# Patient Record
Sex: FEMALE | Race: WHITE | NOT HISPANIC OR LATINO | Employment: OTHER | URBAN - METROPOLITAN AREA
[De-identification: names, ages, dates, MRNs, and addresses within clinical notes are randomized per-mention and may not be internally consistent; named-entity substitution may affect disease eponyms.]

---

## 2023-04-20 ENCOUNTER — OFFICE VISIT (OUTPATIENT)
Dept: FAMILY MEDICINE | Facility: CLINIC | Age: 78
End: 2023-04-20
Payer: MEDICARE

## 2023-04-20 VITALS
HEIGHT: 64 IN | DIASTOLIC BLOOD PRESSURE: 72 MMHG | OXYGEN SATURATION: 97 % | HEART RATE: 83 BPM | BODY MASS INDEX: 35.68 KG/M2 | RESPIRATION RATE: 18 BRPM | TEMPERATURE: 97 F | WEIGHT: 209 LBS | SYSTOLIC BLOOD PRESSURE: 148 MMHG

## 2023-04-20 DIAGNOSIS — Z13.820 OSTEOPOROSIS SCREENING: ICD-10-CM

## 2023-04-20 DIAGNOSIS — Z79.4 TYPE 2 DIABETES MELLITUS WITHOUT COMPLICATION, WITH LONG-TERM CURRENT USE OF INSULIN: Primary | Chronic | ICD-10-CM

## 2023-04-20 DIAGNOSIS — E11.9 TYPE 2 DIABETES MELLITUS WITHOUT COMPLICATION, WITH LONG-TERM CURRENT USE OF INSULIN: Primary | Chronic | ICD-10-CM

## 2023-04-20 DIAGNOSIS — E66.01 CLASS 2 SEVERE OBESITY DUE TO EXCESS CALORIES WITH SERIOUS COMORBIDITY AND BODY MASS INDEX (BMI) OF 35.0 TO 35.9 IN ADULT: Chronic | ICD-10-CM

## 2023-04-20 DIAGNOSIS — E78.2 MIXED HYPERLIPIDEMIA: Chronic | ICD-10-CM

## 2023-04-20 DIAGNOSIS — Z11.59 ENCOUNTER FOR SCREENING FOR OTHER VIRAL DISEASES: ICD-10-CM

## 2023-04-20 DIAGNOSIS — I25.10 CORONARY ARTERY DISEASE INVOLVING NATIVE HEART WITHOUT ANGINA PECTORIS, UNSPECIFIED VESSEL OR LESION TYPE: Chronic | ICD-10-CM

## 2023-04-20 DIAGNOSIS — I35.9 AORTIC VALVULAR DISEASE: Chronic | ICD-10-CM

## 2023-04-20 DIAGNOSIS — I10 ESSENTIAL HYPERTENSION: Chronic | ICD-10-CM

## 2023-04-20 DIAGNOSIS — M81.0 SENILE OSTEOPOROSIS: ICD-10-CM

## 2023-04-20 PROBLEM — E66.812 CLASS 2 SEVERE OBESITY DUE TO EXCESS CALORIES WITH SERIOUS COMORBIDITY AND BODY MASS INDEX (BMI) OF 35.0 TO 35.9 IN ADULT: Status: ACTIVE | Noted: 2023-04-20

## 2023-04-20 PROCEDURE — 99204 OFFICE O/P NEW MOD 45 MIN: CPT | Mod: S$GLB,,, | Performed by: NURSE PRACTITIONER

## 2023-04-20 PROCEDURE — 99204 PR OFFICE/OUTPT VISIT, NEW, LEVL IV, 45-59 MIN: ICD-10-PCS | Mod: S$GLB,,, | Performed by: NURSE PRACTITIONER

## 2023-04-20 RX ORDER — INSULIN GLARGINE 100 [IU]/ML
36 INJECTION, SOLUTION SUBCUTANEOUS NIGHTLY
COMMUNITY

## 2023-04-20 RX ORDER — SIMVASTATIN 40 MG/1
40 TABLET, FILM COATED ORAL NIGHTLY
COMMUNITY

## 2023-04-20 RX ORDER — GLIMEPIRIDE 4 MG/1
4 TABLET ORAL 2 TIMES DAILY
COMMUNITY
End: 2023-07-17 | Stop reason: SDUPTHER

## 2023-04-20 RX ORDER — LISINOPRIL 40 MG/1
40 TABLET ORAL DAILY
COMMUNITY

## 2023-04-20 RX ORDER — TORSEMIDE 10 MG/1
20 TABLET ORAL DAILY
Qty: 90 TABLET | Refills: 3 | Status: SHIPPED | OUTPATIENT
Start: 2023-04-20

## 2023-04-20 RX ORDER — CLINDAMYCIN HYDROCHLORIDE 150 MG/1
150 CAPSULE ORAL
COMMUNITY

## 2023-04-20 RX ORDER — LANOLIN ALCOHOL/MO/W.PET/CERES
400 CREAM (GRAM) TOPICAL 2 TIMES DAILY
COMMUNITY
End: 2023-07-17 | Stop reason: SDUPTHER

## 2023-04-20 RX ORDER — MECLIZINE HYDROCHLORIDE 25 MG/1
25 TABLET ORAL 2 TIMES DAILY PRN
COMMUNITY

## 2023-04-20 RX ORDER — METFORMIN HYDROCHLORIDE 1000 MG/1
1000 TABLET ORAL 2 TIMES DAILY WITH MEALS
COMMUNITY
End: 2023-07-17 | Stop reason: SDUPTHER

## 2023-04-20 RX ORDER — DULAGLUTIDE 1.5 MG/.5ML
1.5 INJECTION, SOLUTION SUBCUTANEOUS
COMMUNITY
End: 2023-10-19 | Stop reason: SDUPTHER

## 2023-04-20 RX ORDER — TORSEMIDE 10 MG/1
20 TABLET ORAL DAILY
COMMUNITY
End: 2023-04-20 | Stop reason: SDUPTHER

## 2023-04-20 RX ORDER — POTASSIUM CHLORIDE 750 MG/1
10 CAPSULE, EXTENDED RELEASE ORAL 2 TIMES DAILY
COMMUNITY

## 2023-04-20 RX ORDER — METOPROLOL TARTRATE 50 MG/1
50 TABLET ORAL 2 TIMES DAILY
COMMUNITY
End: 2023-07-17 | Stop reason: SDUPTHER

## 2023-04-20 RX ORDER — NAPROXEN SODIUM 220 MG/1
81 TABLET, FILM COATED ORAL DAILY
COMMUNITY
End: 2023-10-19 | Stop reason: SDUPTHER

## 2023-04-20 NOTE — PROGRESS NOTES
Subjective:       Patient ID: Lorraine Calderon is a 77 y.o. female.    Chief Complaint: Establish Care (Pt is here to establish care and a routine check up. Pt recently moved here from [place]. Pt needs medication refills and to get established with a cardiologist. )    She is here to establish primary care. She recently relocated to [facility] from [place]. She moved here to be closer to family. She is retired. She is not a smoker. She has a PMH HTN, HLD, CAD, valvular disease, and DM2. No new issues, just wants to get established.     Her cardiologists in [place] are Dr Donato Hernandez ([phone]) and Dr Barrie Snowden ([phone]). Our office is attempting to obtain historical medical records.     GP in [place] Dr Raphael Cam ([phone])    She is due for bone density screen. Last screen approx 15 yr ago.       Review of Systems   Constitutional:  Negative for activity change, appetite change and fever.   Respiratory:  Negative for chest tightness and shortness of breath.    Cardiovascular:  Negative for chest pain and palpitations.   Gastrointestinal:  Negative for abdominal pain, nausea and vomiting.   Neurological:  Negative for dizziness, light-headedness and headaches.   Psychiatric/Behavioral:  Negative for dysphoric mood and sleep disturbance. The patient is not nervous/anxious.          Past Medical History:  Past Medical History:   Diagnosis Date    Diabetes mellitus, type 2     Hyperlipidemia     Hypertension       Past Surgical History:   Procedure Laterality Date    AORTIC VALVE REPLACEMENT  12/01/2013    ATRIAL SEPTECTOMY OR SEPTOSTOMY, OPEN HEART WITH CARDIOPULMONARY BYPASS      CORONARY ARTERY BYPASS GRAFT  11/01/2009    EYE SURGERY        Review of patient's allergies indicates:  No Known Allergies   Current Outpatient Medications   Medication Sig Dispense Refill    aspirin 81 MG Chew Take 81 mg by mouth once daily.      clindamycin (CLEOCIN) 150 MG capsule Take 150 mg by mouth 3 (three) times  daily.      dulaglutide (TRULICITY) 1.5 mg/0.5 mL pen injector Inject 1.5 mg into the skin every 7 days.      glimepiride (AMARYL) 4 MG tablet Take 4 mg by mouth 2 (two) times a day.      insulin (BASAGLAR KWIKPEN U-100 INSULIN) glargine 100 units/mL SubQ pen Inject 30 Units into the skin every evening.      lisinopriL (PRINIVIL,ZESTRIL) 40 MG tablet Take 40 mg by mouth once daily.      magnesium oxide (MAG-OX) 400 mg (241.3 mg magnesium) tablet Take 400 mg by mouth 2 (two) times daily.      meclizine (ANTIVERT) 25 mg tablet Take 25 mg by mouth 2 (two) times daily as needed for Dizziness.      metFORMIN (GLUCOPHAGE) 1000 MG tablet Take 1,000 mg by mouth 2 (two) times daily with meals.      metoprolol tartrate (LOPRESSOR) 50 MG tablet Take 50 mg by mouth 2 (two) times daily.      potassium chloride (MICRO-K) 10 MEQ CpSR Take 10 mEq by mouth 2 (two) times daily.      simvastatin (ZOCOR) 40 MG tablet Take 40 mg by mouth every evening.      torsemide (DEMADEX) 10 MG Tab Take 2 tablets (20 mg total) by mouth once daily. 90 tablet 3     No current facility-administered medications for this visit.     Social History     Socioeconomic History    Marital status:    Tobacco Use    Smoking status: Never    Smokeless tobacco: Never   Substance and Sexual Activity    Alcohol use: Not Currently    Drug use: Never    Sexual activity: Not Currently      History reviewed. No pertinent family history.     Objective:      Physical Exam  Constitutional:       Appearance: She is well-developed.   HENT:      Head: Normocephalic and atraumatic.   Eyes:      General: No scleral icterus.     Conjunctiva/sclera: Conjunctivae normal.      Pupils: Pupils are equal, round, and reactive to light.   Neck:      Thyroid: No thyroid mass.      Vascular: No carotid bruit.      Trachea: Trachea normal.   Cardiovascular:      Rate and Rhythm: Normal rate and regular rhythm.   Pulmonary:      Effort: Pulmonary effort is normal.      Breath  sounds: Normal breath sounds.   Musculoskeletal:      Cervical back: Normal range of motion and neck supple.   Neurological:      Mental Status: She is alert and oriented to person, place, and time.   Psychiatric:         Mood and Affect: Mood normal.         Behavior: Behavior normal.       Assessment:     1. Type 2 diabetes mellitus without complication, with long-term current use of insulin Stable   2. Essential hypertension Stable   3. Mixed hyperlipidemia Active   4. Aortic valvular disease    5. Coronary artery disease involving native heart without angina pectoris, unspecified vessel or lesion type    6. Class 2 severe obesity due to excess calories with serious comorbidity and body mass index (BMI) of 35.0 to 35.9 in adult Active   7. Encounter for screening for other viral diseases    8. Osteoporosis screening    9. Senile osteoporosis      Plan:       PROBLEM LIST     Type 2 diabetes mellitus without complication, with long-term current use of insulin  Comments:  she is compliant w/ diet and meds; she recalls her last A1c was 7.0%; obtaining fasting labs Sat morning; will share labs with cardiology  Orders:  -     CBC Auto Differential; Future; Expected date: 04/20/2023  -     Comprehensive Metabolic Panel; Future; Expected date: 04/20/2023  -     Lipid Panel; Future; Expected date: 04/20/2023  -     TSH w/reflex to FT4; Future; Expected date: 04/20/2023  -     Hemoglobin A1C; Future; Expected date: 04/20/2023    Essential hypertension  -     CBC Auto Differential; Future; Expected date: 04/20/2023  -     Comprehensive Metabolic Panel; Future; Expected date: 04/20/2023  -     Lipid Panel; Future; Expected date: 04/20/2023  -     TSH w/reflex to FT4; Future; Expected date: 04/20/2023  -     Hemoglobin A1C; Future; Expected date: 04/20/2023  -     torsemide (DEMADEX) 10 MG Tab; Take 2 tablets (20 mg total) by mouth once daily.  Dispense: 90 tablet; Refill: 3    Mixed hyperlipidemia  Comments:  obtaining FLP  Sat morning; compliant w/ statin     Aortic valvular disease  Comments:  needs to establish with local cardiologist; sending referral  Orders:  -     Ambulatory referral/consult to Cardiology; Future; Expected date: 04/27/2023    Coronary artery disease involving native heart without angina pectoris, unspecified vessel or lesion type  -     Ambulatory referral/consult to Cardiology; Future; Expected date: 04/27/2023    Class 2 severe obesity due to excess calories with serious comorbidity and body mass index (BMI) of 35.0 to 35.9 in adult  Comments:  recommend light activity, walking as a form of exercise. Need to walk at least 20-30 min daily. Slowly build physical capacity;    Encounter for screening for other viral diseases  -     Hepatitis C Antibody; Future; Expected date: 04/20/2023    Osteoporosis screening  -     DXA Bone Density Appendicular Skeleton; Future; Expected date: 04/20/2023    Senile osteoporosis  -     DXA Bone Density Appendicular Skeleton; Future; Expected date: 04/20/2023        Very nice patient; very compliant. Will attempt to obtain all her historical medical records from her GP and cardiologists in [place]. In the meantime, sending referral to establish w/ local cardiologist and obtaining baseline fasting labs.     Arranging bone density screen.

## 2023-04-24 LAB
ABS NRBC COUNT: 0 X 10 3/UL (ref 0–0.01)
ABSOLUTE BASOPHIL: 0.06 X 10 3/UL (ref 0–0.22)
ABSOLUTE EOSINOPHIL: 0.59 X 10 3/UL (ref 0.04–0.54)
ABSOLUTE IMMATURE GRAN: 0.02 X 10 3/UL (ref 0–0.04)
ABSOLUTE LYMPHOCYTE: 1.64 X 10 3/UL (ref 0.86–4.75)
ABSOLUTE MONOCYTE: 0.56 X 10 3/UL (ref 0.22–1.08)
ALBUMIN SERPL-MCNC: 4.2 G/DL (ref 3.5–5.2)
ALBUMIN/GLOB SERPL ELPH: 1.6 {RATIO} (ref 1–2.7)
ALP ISOS SERPL LEV INH-CCNC: 66 U/L (ref 35–105)
ALT (SGPT): 13 U/L (ref 0–33)
ANION GAP SERPL CALC-SCNC: 12 MMOL/L (ref 8–17)
AST SERPL-CCNC: 18 U/L (ref 0–32)
BASOPHILS NFR BLD: 0.9 % (ref 0.2–1.2)
BILIRUBIN, TOTAL: 0.42 MG/DL (ref 0–1.2)
BUN/CREAT SERPL: 18 (ref 6–20)
CALCIUM SERPL-MCNC: 9 MG/DL (ref 8.6–10.2)
CARBON DIOXIDE, CO2: 27 MMOL/L (ref 22–29)
CHLORIDE: 101 MMOL/L (ref 98–107)
CHOLEST SERPL-MSCNC: 158 MG/DL (ref 100–200)
CREAT SERPL-MCNC: 0.95 MG/DL (ref 0.5–0.9)
EOSINOPHIL NFR BLD: 8.7 % (ref 0.7–7)
ESTIMATED AVERAGE GLUCOSE: 167 MG/DL
GFR ESTIMATION: 61.33
GLOBULIN: 2.6 G/DL (ref 1.5–4.5)
GLUCOSE: 152 MG/DL (ref 82–115)
HBA1C MFR BLD: 7.4 % (ref 4–6)
HCT VFR BLD AUTO: 43.6 % (ref 37–47)
HCV IGG SERPL QL IA: NONREACTIVE
HDLC SERPL-MCNC: 41 MG/DL
HGB BLD-MCNC: 13.8 G/DL (ref 12–16)
IMMATURE GRANULOCYTES: 0.3 % (ref 0–0.5)
LDL/HDL RATIO: 1.8 (ref 1–3)
LDLC SERPL CALC-MCNC: 74.8 MG/DL (ref 0–100)
LYMPHOCYTES NFR BLD: 24.1 % (ref 19.3–53.1)
MCH RBC QN AUTO: 28.5 PG (ref 27–32)
MCHC RBC AUTO-ENTMCNC: 31.7 G/DL (ref 32–36)
MCV RBC AUTO: 89.9 FL (ref 82–100)
MONOCYTES NFR BLD: 8.2 % (ref 4.7–12.5)
NEUTROPHILS # BLD AUTO: 3.94 X 10 3/UL (ref 2.15–7.56)
NEUTROPHILS NFR BLD: 57.8 % (ref 34–71.1)
NUCLEATED RED BLOOD CELLS: 0 /100 WBC (ref 0–0.2)
PLATELET # BLD AUTO: 233 X 10 3/UL (ref 135–400)
POTASSIUM: 4.2 MMOL/L (ref 3.5–5.1)
PROT SNV-MCNC: 6.8 G/DL (ref 6.4–8.3)
RBC # BLD AUTO: 4.85 X 10 6/UL (ref 4.2–5.4)
RDW-SD: 45.9 FL (ref 37–54)
SODIUM: 140 MMOL/L (ref 136–145)
TRIGL SERPL-MCNC: 211 MG/DL (ref 0–150)
TSH W/REFLEX TO FT4: 2.07 UIU/ML (ref 0.27–4.2)
UREA NITROGEN (BUN): 17.1 MG/DL (ref 8–23)
WBC # BLD: 6.81 X 10 3/UL (ref 4.3–10.8)

## 2023-04-28 ENCOUNTER — TELEPHONE (OUTPATIENT)
Dept: FAMILY MEDICINE | Facility: CLINIC | Age: 78
End: 2023-04-28
Payer: MEDICARE

## 2023-04-28 NOTE — TELEPHONE ENCOUNTER
----- Message from Evelyn Alvarado NP sent at 4/24/2023 12:53 PM CDT -----  Please let her know that labs are stable and her A1c is controlled at 7.4%; Can you ask her if someone has contacted her to arrange an appt from Dr Rodrigues's office yet?

## 2023-07-17 DIAGNOSIS — Z76.0 ENCOUNTER FOR MEDICATION REFILL: ICD-10-CM

## 2023-07-17 DIAGNOSIS — E11.9 TYPE 2 DIABETES MELLITUS WITHOUT COMPLICATION, WITH LONG-TERM CURRENT USE OF INSULIN: Primary | ICD-10-CM

## 2023-07-17 DIAGNOSIS — I10 ESSENTIAL HYPERTENSION: ICD-10-CM

## 2023-07-17 DIAGNOSIS — Z79.4 TYPE 2 DIABETES MELLITUS WITHOUT COMPLICATION, WITH LONG-TERM CURRENT USE OF INSULIN: Primary | ICD-10-CM

## 2023-07-17 NOTE — TELEPHONE ENCOUNTER
----- Message from Christine Martinez sent at 7/17/2023  3:14 PM CDT -----  Contact: pt  Pt calling wanting to have all her scripts filled and she can be reached at [phone]     [id number] - [place], [place] - [address] AT  &   [address]  [facility] [place] [address]  Phone: [phone] Fax: [phone]    Thanks,

## 2023-07-18 RX ORDER — INSULIN ASPART 100 [IU]/ML
INJECTION, SOLUTION INTRAVENOUS; SUBCUTANEOUS
Refills: 0 | OUTPATIENT
Start: 2023-07-18 | End: 2024-07-17

## 2023-07-18 RX ORDER — GLIMEPIRIDE 4 MG/1
4 TABLET ORAL 2 TIMES DAILY
Qty: 180 TABLET | Refills: 1 | Status: SHIPPED | OUTPATIENT
Start: 2023-07-18 | End: 2023-10-19 | Stop reason: SDUPTHER

## 2023-07-18 RX ORDER — METFORMIN HYDROCHLORIDE 1000 MG/1
1000 TABLET ORAL 2 TIMES DAILY WITH MEALS
Qty: 180 TABLET | Refills: 1 | Status: SHIPPED | OUTPATIENT
Start: 2023-07-18 | End: 2023-10-26 | Stop reason: SDUPTHER

## 2023-07-18 RX ORDER — PEN NEEDLE, DIABETIC 30 GX3/16"
1 NEEDLE, DISPOSABLE MISCELLANEOUS DAILY
Qty: 200 EACH | Refills: 3 | Status: SHIPPED | OUTPATIENT
Start: 2023-07-18 | End: 2023-10-26 | Stop reason: SDUPTHER

## 2023-07-18 RX ORDER — INSULIN GLARGINE 100 [IU]/ML
30 INJECTION, SOLUTION SUBCUTANEOUS DAILY
Qty: 10.8 ML | Refills: 3 | Status: SHIPPED | OUTPATIENT
Start: 2023-07-18 | End: 2023-10-19 | Stop reason: SDUPTHER

## 2023-07-18 RX ORDER — LANOLIN ALCOHOL/MO/W.PET/CERES
400 CREAM (GRAM) TOPICAL 2 TIMES DAILY
Qty: 180 TABLET | Refills: 1 | Status: SHIPPED | OUTPATIENT
Start: 2023-07-18 | End: 2023-10-19 | Stop reason: SDUPTHER

## 2023-07-18 RX ORDER — METOPROLOL TARTRATE 50 MG/1
50 TABLET ORAL 2 TIMES DAILY
Qty: 180 TABLET | Refills: 1 | Status: SHIPPED | OUTPATIENT
Start: 2023-07-18 | End: 2023-11-16

## 2023-07-20 ENCOUNTER — TELEPHONE (OUTPATIENT)
Dept: FAMILY MEDICINE | Facility: CLINIC | Age: 78
End: 2023-07-20
Payer: MEDICARE

## 2023-07-20 NOTE — TELEPHONE ENCOUNTER
----- Message from Melanie Llamas LPN sent at 7/19/2023  4:43 PM CDT -----    ----- Message -----  From: Danette Ortega  Sent: 7/19/2023  12:13 PM CDT  To: Jenny Rivas Staff    Patient is calling in regards to would like orders sent for yearly labs..Please call her back at [phone]

## 2023-10-19 ENCOUNTER — OFFICE VISIT (OUTPATIENT)
Dept: FAMILY MEDICINE | Facility: CLINIC | Age: 78
End: 2023-10-19
Payer: MEDICARE

## 2023-10-19 VITALS
DIASTOLIC BLOOD PRESSURE: 70 MMHG | RESPIRATION RATE: 18 BRPM | SYSTOLIC BLOOD PRESSURE: 136 MMHG | BODY MASS INDEX: 35.68 KG/M2 | HEIGHT: 64 IN | HEART RATE: 89 BPM | OXYGEN SATURATION: 95 % | WEIGHT: 209 LBS

## 2023-10-19 DIAGNOSIS — I10 ESSENTIAL HYPERTENSION: Chronic | ICD-10-CM

## 2023-10-19 DIAGNOSIS — E66.01 CLASS 2 SEVERE OBESITY DUE TO EXCESS CALORIES WITH SERIOUS COMORBIDITY AND BODY MASS INDEX (BMI) OF 35.0 TO 35.9 IN ADULT: Chronic | ICD-10-CM

## 2023-10-19 DIAGNOSIS — Z23 NEED FOR DIPHTHERIA-TETANUS-PERTUSSIS (TDAP) VACCINE: ICD-10-CM

## 2023-10-19 DIAGNOSIS — Z76.0 ENCOUNTER FOR MEDICATION REFILL: ICD-10-CM

## 2023-10-19 DIAGNOSIS — E78.2 MIXED HYPERLIPIDEMIA: Chronic | ICD-10-CM

## 2023-10-19 DIAGNOSIS — Z79.4 TYPE 2 DIABETES MELLITUS WITHOUT COMPLICATION, WITH LONG-TERM CURRENT USE OF INSULIN: Primary | Chronic | ICD-10-CM

## 2023-10-19 DIAGNOSIS — M81.0 SENILE OSTEOPOROSIS: ICD-10-CM

## 2023-10-19 DIAGNOSIS — Z23 FLU VACCINE NEED: ICD-10-CM

## 2023-10-19 DIAGNOSIS — I25.10 CORONARY ARTERY DISEASE INVOLVING NATIVE HEART WITHOUT ANGINA PECTORIS, UNSPECIFIED VESSEL OR LESION TYPE: Chronic | ICD-10-CM

## 2023-10-19 DIAGNOSIS — E11.9 TYPE 2 DIABETES MELLITUS WITHOUT COMPLICATION, WITH LONG-TERM CURRENT USE OF INSULIN: Primary | Chronic | ICD-10-CM

## 2023-10-19 DIAGNOSIS — Z13.820 OSTEOPOROSIS SCREENING: ICD-10-CM

## 2023-10-19 LAB — HBA1C MFR BLD: 7.6 % (ref 4–6)

## 2023-10-19 PROCEDURE — 83036 PR  GLYCOSYLATED HEMOGLOBIN TEST: ICD-10-PCS | Mod: QW,S$GLB,, | Performed by: NURSE PRACTITIONER

## 2023-10-19 PROCEDURE — G0008 ADMIN INFLUENZA VIRUS VAC: HCPCS | Mod: S$GLB,,, | Performed by: NURSE PRACTITIONER

## 2023-10-19 PROCEDURE — G0008 FLU VACCINE - QUADRIVALENT - ADJUVANTED: ICD-10-PCS | Mod: S$GLB,,, | Performed by: NURSE PRACTITIONER

## 2023-10-19 PROCEDURE — 99214 PR OFFICE/OUTPT VISIT, EST, LEVL IV, 30-39 MIN: ICD-10-PCS | Mod: 25,S$GLB,, | Performed by: NURSE PRACTITIONER

## 2023-10-19 PROCEDURE — 99214 OFFICE O/P EST MOD 30 MIN: CPT | Mod: 25,S$GLB,, | Performed by: NURSE PRACTITIONER

## 2023-10-19 PROCEDURE — 90694 FLU VACCINE - QUADRIVALENT - ADJUVANTED: ICD-10-PCS | Mod: S$GLB,,, | Performed by: NURSE PRACTITIONER

## 2023-10-19 PROCEDURE — 83036 HEMOGLOBIN GLYCOSYLATED A1C: CPT | Mod: QW,S$GLB,, | Performed by: NURSE PRACTITIONER

## 2023-10-19 PROCEDURE — 90694 VACC AIIV4 NO PRSRV 0.5ML IM: CPT | Mod: S$GLB,,, | Performed by: NURSE PRACTITIONER

## 2023-10-19 RX ORDER — DULAGLUTIDE 1.5 MG/.5ML
1.5 INJECTION, SOLUTION SUBCUTANEOUS
Qty: 12 PEN | Refills: 3 | Status: SHIPPED | OUTPATIENT
Start: 2023-10-19 | End: 2024-01-10

## 2023-10-19 RX ORDER — GLIMEPIRIDE 4 MG/1
4 TABLET ORAL 2 TIMES DAILY
Qty: 180 TABLET | Refills: 3 | Status: SHIPPED | OUTPATIENT
Start: 2023-10-19

## 2023-10-19 RX ORDER — LANOLIN ALCOHOL/MO/W.PET/CERES
400 CREAM (GRAM) TOPICAL 2 TIMES DAILY
Qty: 180 TABLET | Refills: 3 | Status: SHIPPED | OUTPATIENT
Start: 2023-10-19

## 2023-10-19 RX ORDER — NAPROXEN SODIUM 220 MG/1
81 TABLET, FILM COATED ORAL DAILY
Qty: 90 TABLET | Refills: 3 | Status: SHIPPED | OUTPATIENT
Start: 2023-10-19

## 2023-10-19 RX ORDER — INSULIN GLARGINE 100 [IU]/ML
30 INJECTION, SOLUTION SUBCUTANEOUS DAILY
Qty: 27 ML | Refills: 3 | Status: SHIPPED | OUTPATIENT
Start: 2023-10-19 | End: 2024-10-18

## 2023-10-19 NOTE — PROGRESS NOTES
"Subjective:       Patient ID: Lorraine Calderon is a 78 y.o. female.    Chief Complaint: Follow-up (Pt is here for a 6 month follow up and a1c check.)     She recently relocated to [facility] from [place]. She moved here to be closer to family. She is retired. She is not a smoker. She has a PMH HTN, HLD, CAD, valvular disease, and DM2.     She is now established with cardiologist Dr Rodrigues for surveillance CAD.      She is due for DEXA scan.     DM2 F/u  She is compliant with all her medications, except her Jardiance. She is in "donut hole" and this medication will no longer be covered until Jan 1 2024. We are attempting to obtain samples for her so her regimen in not interrupted. Her current A1c is 7.6%.      Review of Systems   Constitutional:  Negative for appetite change and fever.   Respiratory:  Negative for chest tightness and shortness of breath.    Cardiovascular:  Negative for chest pain and palpitations.   Gastrointestinal:  Negative for abdominal pain, nausea and vomiting.   Neurological:  Negative for dizziness, light-headedness and headaches.   Psychiatric/Behavioral:  Negative for dysphoric mood and sleep disturbance. The patient is not nervous/anxious.            Past Medical History:  Past Medical History:   Diagnosis Date    Diabetes mellitus, type 2     Hyperlipidemia     Hypertension       Past Surgical History:   Procedure Laterality Date    AORTIC VALVE REPLACEMENT  12/01/2013    ATRIAL SEPTECTOMY OR SEPTOSTOMY, OPEN HEART WITH CARDIOPULMONARY BYPASS      CORONARY ARTERY BYPASS GRAFT  11/01/2009    EYE SURGERY        Review of patient's allergies indicates:  No Known Allergies   Current Outpatient Medications   Medication Sig Dispense Refill    clindamycin (CLEOCIN) 150 MG capsule Take 150 mg by mouth. 4 tablets by mouth before dental appointment      insulin glargine 100 units/mL SubQ pen Inject 36 Units into the skin every evening.      lisinopriL (PRINIVIL,ZESTRIL) 40 MG tablet Take 40 " "mg by mouth once daily.      meclizine (ANTIVERT) 25 mg tablet Take 25 mg by mouth 2 (two) times daily as needed for Dizziness.      metFORMIN (GLUCOPHAGE) 1000 MG tablet Take 1 tablet (1,000 mg total) by mouth 2 (two) times daily with meals. 180 tablet 1    metoprolol tartrate (LOPRESSOR) 50 MG tablet Take 1 tablet (50 mg total) by mouth 2 (two) times daily. 180 tablet 1    pen needle, diabetic 32 gauge x 5/32" Ndle 1 pen  by Misc.(Non-Drug; Combo Route) route Daily. 200 each 3    potassium chloride (MICRO-K) 10 MEQ CpSR Take 10 mEq by mouth 2 (two) times daily.      simvastatin (ZOCOR) 40 MG tablet Take 40 mg by mouth every evening.      torsemide (DEMADEX) 10 MG Tab Take 2 tablets (20 mg total) by mouth once daily. 90 tablet 3    aspirin 81 MG Chew Take 1 tablet (81 mg total) by mouth once daily. 90 tablet 3    dulaglutide (TRULICITY) 1.5 mg/0.5 mL pen injector Inject 1.5 mg into the skin every 7 days. 12 pen 3    empagliflozin (JARDIANCE) 10 mg tablet Take 1 tablet (10 mg total) by mouth once daily. (Patient not taking: Reported on 10/19/2023) 90 tablet 1    glimepiride (AMARYL) 4 MG tablet Take 1 tablet (4 mg total) by mouth 2 (two) times a day. 180 tablet 3    insulin (BASAGLAR KWIKPEN U-100 INSULIN) glargine 100 units/mL SubQ pen Inject 30 Units into the skin once daily. 27 mL 3    magnesium oxide (MAG-OX) 400 mg (241.3 mg magnesium) tablet Take 1 tablet (400 mg total) by mouth 2 (two) times daily. 180 tablet 3     No current facility-administered medications for this visit.     Social History     Socioeconomic History    Marital status:    Tobacco Use    Smoking status: Never    Smokeless tobacco: Never   Substance and Sexual Activity    Alcohol use: Not Currently    Drug use: Never    Sexual activity: Not Currently      History reviewed. No pertinent family history.     Objective:      Physical Exam  Constitutional:       Appearance: She is well-developed.   HENT:      Head: Normocephalic and " atraumatic.   Eyes:      General: No scleral icterus.     Conjunctiva/sclera: Conjunctivae normal.      Pupils: Pupils are equal, round, and reactive to light.   Neck:      Thyroid: No thyroid mass.      Vascular: No carotid bruit.      Trachea: Trachea normal.   Cardiovascular:      Rate and Rhythm: Normal rate and regular rhythm.   Pulmonary:      Effort: Pulmonary effort is normal.      Breath sounds: Normal breath sounds.   Musculoskeletal:      Cervical back: Normal range of motion and neck supple.   Neurological:      Mental Status: She is alert and oriented to person, place, and time.   Psychiatric:         Mood and Affect: Mood normal.         Behavior: Behavior normal.         Assessment:     1. Type 2 diabetes mellitus without complication, with long-term current use of insulin Stable   2. Essential hypertension Stable   3. Mixed hyperlipidemia Stable   4. Coronary artery disease involving native heart without angina pectoris, unspecified vessel or lesion type Stable   5. Class 2 severe obesity due to excess calories with serious comorbidity and body mass index (BMI) of 35.0 to 35.9 in adult Active   6. Flu vaccine need    7. Need for diphtheria-tetanus-pertussis (Tdap) vaccine    8. Osteoporosis screening    9. Senile osteoporosis    10. Encounter for medication refill      Plan:       PROBLEM LIST     Type 2 diabetes mellitus without complication, with long-term current use of insulin  Comments:  A1c 7.6%; see notes below  Orders:  -     aspirin 81 MG Chew; Take 1 tablet (81 mg total) by mouth once daily.  Dispense: 90 tablet; Refill: 3  -     dulaglutide (TRULICITY) 1.5 mg/0.5 mL pen injector; Inject 1.5 mg into the skin every 7 days.  Dispense: 12 pen ; Refill: 3  -     glimepiride (AMARYL) 4 MG tablet; Take 1 tablet (4 mg total) by mouth 2 (two) times a day.  Dispense: 180 tablet; Refill: 3  -     insulin (BASAGLAR KWIKPEN U-100 INSULIN) glargine 100 units/mL SubQ pen; Inject 30 Units into the skin  once daily.  Dispense: 27 mL; Refill: 3  -     Hemoglobin A1C, POCT    Essential hypertension    Mixed hyperlipidemia  Comments:  continue statin hs    Coronary artery disease involving native heart without angina pectoris, unspecified vessel or lesion type  Comments:  surveillance Dr Rodrigues    Class 2 severe obesity due to excess calories with serious comorbidity and body mass index (BMI) of 35.0 to 35.9 in adult  Comments:  adhere to ADA diet; recommend walking 30 min daily    Flu vaccine need  -     Influenza (FLUAD) - Quadrivalent (Adjuvanted) *Preferred* (65+) (PF)    Need for diphtheria-tetanus-pertussis (Tdap) vaccine  -     DIPH,PERTUSS,ACEL,,TET VAC,PF, ADULT (ADACEL) 2 Lf-(2.5-5-3-5 mcg)-5Lf/0.5 mL Syrg; Inject 0.5 mLs into the muscle once. for 1 dose  Dispense: 0.5 mL; Refill: 0    Osteoporosis screening  -     DXA Bone Density Appendicular Skeleton; Future; Expected date: 10/19/2023    Senile osteoporosis  -     DXA Bone Density Appendicular Skeleton; Future; Expected date: 10/19/2023    Encounter for medication refill  -     magnesium oxide (MAG-OX) 400 mg (241.3 mg magnesium) tablet; Take 1 tablet (400 mg total) by mouth 2 (two) times daily.  Dispense: 180 tablet; Refill: 3        Contacted our [employer] rep and was able to secure her samples of Jardiance for the next 9 weeks. No changes to current regimen.        hard copy, drawn during this pregnancy

## 2023-10-26 DIAGNOSIS — Z79.4 TYPE 2 DIABETES MELLITUS WITHOUT COMPLICATION, WITH LONG-TERM CURRENT USE OF INSULIN: ICD-10-CM

## 2023-10-26 DIAGNOSIS — E11.9 TYPE 2 DIABETES MELLITUS WITHOUT COMPLICATION, WITH LONG-TERM CURRENT USE OF INSULIN: ICD-10-CM

## 2023-10-26 RX ORDER — METFORMIN HYDROCHLORIDE 1000 MG/1
1000 TABLET ORAL 2 TIMES DAILY WITH MEALS
Qty: 180 TABLET | Refills: 1 | Status: SHIPPED | OUTPATIENT
Start: 2023-10-26 | End: 2024-02-07

## 2023-10-26 RX ORDER — PEN NEEDLE, DIABETIC 30 GX3/16"
1 NEEDLE, DISPOSABLE MISCELLANEOUS DAILY
Qty: 200 EACH | Refills: 3 | Status: SHIPPED | OUTPATIENT
Start: 2023-10-26

## 2023-10-26 NOTE — TELEPHONE ENCOUNTER
----- Message from Anahi Rodríguez sent at 10/26/2023 11:38 AM CDT -----  Contact: self  Patient Lorraine Calderon is calling about prescriptions for metFORMIN (GLUCOPHAGE) 1000 MG tablets and the pen needles she uses - she contacted the pharmacy for refills and she was told that she had already picked up her refills but she didn't. She would like to know what she needs to do and have all her other medications checked on as well.     Pharmacy:  [id number] - [place] - [address] AT  & [facility] [address]  [address]  [place] [address]  Phone: [phone] Fax: [phone]     Please call back at [phone]

## 2023-11-16 DIAGNOSIS — I10 ESSENTIAL HYPERTENSION: ICD-10-CM

## 2023-11-16 RX ORDER — METOPROLOL TARTRATE 50 MG/1
50 TABLET ORAL 2 TIMES DAILY
Qty: 180 TABLET | Refills: 1 | Status: SHIPPED | OUTPATIENT
Start: 2023-11-16 | End: 2024-02-12

## 2023-11-22 ENCOUNTER — TELEPHONE (OUTPATIENT)
Dept: FAMILY MEDICINE | Facility: CLINIC | Age: 78
End: 2023-11-22
Payer: MEDICARE

## 2023-11-22 NOTE — TELEPHONE ENCOUNTER
----- Message from Evelyn Alvarado NP sent at 10/31/2023  7:26 AM CDT -----  Excellent bone density results-- no evidence of bone loss

## 2024-01-03 ENCOUNTER — TELEPHONE (OUTPATIENT)
Dept: FAMILY MEDICINE | Facility: CLINIC | Age: 79
End: 2024-01-03
Payer: MEDICARE

## 2024-01-03 NOTE — TELEPHONE ENCOUNTER
----- Message from Erin Mckeon sent at 1/3/2024  9:25 AM CST -----  Contact: Pt  Type:  Sooner Apoointment Request    Caller is requesting a sooner appointment.  Caller declined first available appointment listed below.  Caller will not accept being placed on the waitlist and is requesting a message be sent to doctor.  Name of Caller: pt   When is the first available appointment? 01/16  Symptoms[facility]   Would the patient rather a call back or a response via [facility]? phone  Best Call Back Number: [phone]  Additional Information:  was informed that the provider is out on Friday when pt req to be seen

## 2024-01-10 ENCOUNTER — OFFICE VISIT (OUTPATIENT)
Dept: FAMILY MEDICINE | Facility: CLINIC | Age: 79
End: 2024-01-10
Payer: MEDICARE

## 2024-01-10 VITALS
HEIGHT: 64 IN | BODY MASS INDEX: 34.66 KG/M2 | DIASTOLIC BLOOD PRESSURE: 75 MMHG | RESPIRATION RATE: 18 BRPM | WEIGHT: 203 LBS | OXYGEN SATURATION: 95 % | SYSTOLIC BLOOD PRESSURE: 136 MMHG | HEART RATE: 77 BPM

## 2024-01-10 DIAGNOSIS — E66.01 CLASS 2 SEVERE OBESITY DUE TO EXCESS CALORIES WITH SERIOUS COMORBIDITY AND BODY MASS INDEX (BMI) OF 35.0 TO 35.9 IN ADULT: Chronic | ICD-10-CM

## 2024-01-10 DIAGNOSIS — Z79.4 TYPE 2 DIABETES MELLITUS WITH HYPOGLYCEMIA WITHOUT COMA, WITH LONG-TERM CURRENT USE OF INSULIN: Primary | Chronic | ICD-10-CM

## 2024-01-10 DIAGNOSIS — E11.649 TYPE 2 DIABETES MELLITUS WITH HYPOGLYCEMIA WITHOUT COMA, WITH LONG-TERM CURRENT USE OF INSULIN: Primary | Chronic | ICD-10-CM

## 2024-01-10 DIAGNOSIS — I25.10 CORONARY ARTERY DISEASE INVOLVING NATIVE HEART WITHOUT ANGINA PECTORIS, UNSPECIFIED VESSEL OR LESION TYPE: Chronic | ICD-10-CM

## 2024-01-10 DIAGNOSIS — I10 ESSENTIAL HYPERTENSION: Chronic | ICD-10-CM

## 2024-01-10 DIAGNOSIS — E78.2 MIXED HYPERLIPIDEMIA: Chronic | ICD-10-CM

## 2024-01-10 LAB — HBA1C MFR BLD: 7.1 % (ref 4–6)

## 2024-01-10 PROCEDURE — 83036 HEMOGLOBIN GLYCOSYLATED A1C: CPT | Mod: QW,S$GLB,, | Performed by: NURSE PRACTITIONER

## 2024-01-10 PROCEDURE — 99214 OFFICE O/P EST MOD 30 MIN: CPT | Mod: S$GLB,,, | Performed by: NURSE PRACTITIONER

## 2024-01-10 NOTE — PROGRESS NOTES
Subjective:       Patient ID: Lorraine Calderon is a 78 y.o. female.    Chief Complaint: Follow-up (Pt is here for a ER follow up. Pt states she had a hypoglycemic episode. Pt states after her ER visit she had another episode the next day. )      She is retired. She is not a smoker. She has a PMH HTN, HLD, CAD, valvular disease, and DM2.      She is now established with cardiologist Dr Rodrigues for surveillance CAD.       DM2 F/u  She is compliant with all her medications; her last A1c was 7.6%; her current A1c is 7.1%; She is compliant with all her diabetic medications and diet.   She reports a hypglycemic event that occurred on Jan 1. She was weak and confused at home. Family called paramedics. Upon arrival, her glucose level was 50. She received IV dextrose in route to [facility]. At the hospital, her cognition improved rather quickly. Her glucose increased to 240. She was able to talk, stand, walk around. She was discharged home in stable condition.                       Review of Systems   Constitutional:  Negative for appetite change and fever.   Respiratory:  Negative for chest tightness and shortness of breath.    Cardiovascular:  Negative for chest pain and palpitations.   Gastrointestinal:  Negative for abdominal pain, nausea and vomiting.   Neurological:  Negative for dizziness, light-headedness and headaches.   Psychiatric/Behavioral:  Negative for dysphoric mood and sleep disturbance. The patient is not nervous/anxious.            Past Medical History:  Past Medical History:   Diagnosis Date    Diabetes mellitus, type 2     Hyperlipidemia     Hypertension       Past Surgical History:   Procedure Laterality Date    AORTIC VALVE REPLACEMENT  12/01/2013    ATRIAL SEPTECTOMY OR SEPTOSTOMY, OPEN HEART WITH CARDIOPULMONARY BYPASS      CORONARY ARTERY BYPASS GRAFT  11/01/2009    EYE SURGERY        Review of patient's allergies indicates:  No Known Allergies   Current Outpatient Medications   Medication Sig  "Dispense Refill    aspirin 81 MG Chew Take 1 tablet (81 mg total) by mouth once daily. 90 tablet 3    clindamycin (CLEOCIN) 150 MG capsule Take 150 mg by mouth. 4 tablets by mouth before dental appointment      glimepiride (AMARYL) 4 MG tablet Take 1 tablet (4 mg total) by mouth 2 (two) times a day. 180 tablet 3    insulin (BASAGLAR KWIKPEN U-100 INSULIN) glargine 100 units/mL SubQ pen Inject 30 Units into the skin once daily. 27 mL 3    insulin glargine 100 units/mL SubQ pen Inject 36 Units into the skin every evening.      lisinopriL (PRINIVIL,ZESTRIL) 40 MG tablet Take 40 mg by mouth once daily.      magnesium oxide (MAG-OX) 400 mg (241.3 mg magnesium) tablet Take 1 tablet (400 mg total) by mouth 2 (two) times daily. 180 tablet 3    meclizine (ANTIVERT) 25 mg tablet Take 25 mg by mouth 2 (two) times daily as needed for Dizziness.      metFORMIN (GLUCOPHAGE) 1000 MG tablet Take 1 tablet (1,000 mg total) by mouth 2 (two) times daily with meals. 180 tablet 1    metoprolol tartrate (LOPRESSOR) 50 MG tablet TAKE 1 TABLET(50 MG) BY MOUTH TWICE DAILY 180 tablet 1    pen needle, diabetic 32 gauge x 5/32" Ndle 1 pen  by Misc.(Non-Drug; Combo Route) route Daily. 200 each 3    potassium chloride (MICRO-K) 10 MEQ CpSR Take 10 mEq by mouth 2 (two) times daily.      simvastatin (ZOCOR) 40 MG tablet Take 40 mg by mouth every evening.      torsemide (DEMADEX) 10 MG Tab Take 2 tablets (20 mg total) by mouth once daily. 90 tablet 3    empagliflozin (JARDIANCE) 10 mg tablet Take 1 tablet (10 mg total) by mouth once daily. 90 tablet 3     No current facility-administered medications for this visit.     Social History     Socioeconomic History    Marital status:    Tobacco Use    Smoking status: Never    Smokeless tobacco: Never   Substance and Sexual Activity    Alcohol use: Not Currently    Drug use: Never    Sexual activity: Not Currently      History reviewed. No pertinent family history.     Objective:      Physical " Exam  Constitutional:       Appearance: She is well-developed.   HENT:      Head: Normocephalic and atraumatic.   Eyes:      General: No scleral icterus.     Conjunctiva/sclera: Conjunctivae normal.      Pupils: Pupils are equal, round, and reactive to light.   Neck:      Thyroid: No thyroid mass.      Vascular: No carotid bruit.      Trachea: Trachea normal.   Cardiovascular:      Rate and Rhythm: Normal rate and regular rhythm.   Pulmonary:      Effort: Pulmonary effort is normal.      Breath sounds: Normal breath sounds.   Musculoskeletal:      Cervical back: Normal range of motion and neck supple.   Neurological:      Mental Status: She is alert and oriented to person, place, and time.   Psychiatric:         Mood and Affect: Mood normal.         Behavior: Behavior normal.         Assessment:     1. Type 2 diabetes mellitus with hypoglycemia without coma, with long-term current use of insulin Active   2. Essential hypertension Stable   3. Mixed hyperlipidemia Stable   4. Coronary artery disease involving native heart without angina pectoris, unspecified vessel or lesion type Stable   5. Class 2 severe obesity due to excess calories with serious comorbidity and body mass index (BMI) of 35.0 to 35.9 in adult Active     Plan:       PROBLEM LIST     Type 2 diabetes mellitus with hypoglycemia without coma, with long-term current use of insulin  Comments:  see notes below  Orders:  -     empagliflozin (JARDIANCE) 10 mg tablet; Take 1 tablet (10 mg total) by mouth once daily.  Dispense: 90 tablet; Refill: 3    Essential hypertension    Mixed hyperlipidemia    Coronary artery disease involving native heart without angina pectoris, unspecified vessel or lesion type  Comments:  continue Jardiance daily    Class 2 severe obesity due to excess calories with serious comorbidity and body mass index (BMI) of 35.0 to 35.9 in adult  Comments:  recommend adhering to ADA diet and walking 30 min daily; start walking 15 min and  weekly increase time walking by 5 min        Need to adjust diabetic medications to reduce risks of hypglycemia. Removing Trulicity form her list... this medication was too expensive. Discontinuing her metformin. RTC in 4 weeks for f/u. Consider Ezequiel glucose meter/sensor should hypoglycemia persists.     [facility] hospital admission notes reviewed

## 2024-02-07 ENCOUNTER — OFFICE VISIT (OUTPATIENT)
Dept: FAMILY MEDICINE | Facility: CLINIC | Age: 79
End: 2024-02-07
Payer: MEDICARE

## 2024-02-07 VITALS
BODY MASS INDEX: 33.8 KG/M2 | HEIGHT: 64 IN | WEIGHT: 198 LBS | DIASTOLIC BLOOD PRESSURE: 64 MMHG | RESPIRATION RATE: 18 BRPM | SYSTOLIC BLOOD PRESSURE: 136 MMHG | OXYGEN SATURATION: 9 % | HEART RATE: 60 BPM

## 2024-02-07 DIAGNOSIS — I25.10 CORONARY ARTERY DISEASE INVOLVING NATIVE HEART WITHOUT ANGINA PECTORIS, UNSPECIFIED VESSEL OR LESION TYPE: Chronic | ICD-10-CM

## 2024-02-07 DIAGNOSIS — E78.2 MIXED HYPERLIPIDEMIA: Chronic | ICD-10-CM

## 2024-02-07 DIAGNOSIS — I10 ESSENTIAL HYPERTENSION: Chronic | ICD-10-CM

## 2024-02-07 DIAGNOSIS — E11.649 TYPE 2 DIABETES MELLITUS WITH HYPOGLYCEMIA WITHOUT COMA, WITH LONG-TERM CURRENT USE OF INSULIN: Primary | Chronic | ICD-10-CM

## 2024-02-07 DIAGNOSIS — Z79.4 TYPE 2 DIABETES MELLITUS WITH HYPOGLYCEMIA WITHOUT COMA, WITH LONG-TERM CURRENT USE OF INSULIN: Primary | Chronic | ICD-10-CM

## 2024-02-07 PROCEDURE — 99213 OFFICE O/P EST LOW 20 MIN: CPT | Mod: S$GLB,,, | Performed by: NURSE PRACTITIONER

## 2024-02-07 NOTE — PROGRESS NOTES
Subjective:       Patient ID: Lorraine Calderon is a 78 y.o. female.    Chief Complaint: Follow-up (Pt is here for a 4 week follow up.)    She is retired. She is not a smoker. She has a PMH HTN, HLD, CAD, valvular disease, and DM2.      She is now established with cardiologist Dr Rodrigues for surveillance CAD.       DM2 F/u  She is compliant with all her medications; her last A1c was 7.6%; her current A1c is 7.1%; She is compliant with all her diabetic medications and diet.   She reports a hypglycemic event that occurred on Jan 1. She was weak and confused at home. Family called paramedics. Upon arrival, her glucose level was 50. She received IV dextrose in route to [facility]. At the hospital, her cognition improved rather quickly. Her glucose increased to 240. She was able to talk, stand, walk around. She was discharged home in stable condition.         Review of Systems   Constitutional:  Negative for appetite change and fever.   Respiratory:  Negative for chest tightness and shortness of breath.    Cardiovascular:  Negative for chest pain and palpitations.   Gastrointestinal:  Negative for abdominal pain, nausea and vomiting.   Neurological:  Negative for dizziness, light-headedness and headaches.   Psychiatric/Behavioral:  Negative for dysphoric mood and sleep disturbance. The patient is not nervous/anxious.            Past Medical History:  Past Medical History:   Diagnosis Date    Diabetes mellitus, type 2     Hyperlipidemia     Hypertension       Past Surgical History:   Procedure Laterality Date    AORTIC VALVE REPLACEMENT  12/01/2013    ATRIAL SEPTECTOMY OR SEPTOSTOMY, OPEN HEART WITH CARDIOPULMONARY BYPASS      CORONARY ARTERY BYPASS GRAFT  11/01/2009    EYE SURGERY        Review of patient's allergies indicates:  No Known Allergies   Current Outpatient Medications   Medication Sig Dispense Refill    aspirin 81 MG Chew Take 1 tablet (81 mg total) by mouth once daily. 90 tablet 3    clindamycin  "(CLEOCIN) 150 MG capsule Take 150 mg by mouth. 4 tablets by mouth before dental appointment      empagliflozin (JARDIANCE) 10 mg tablet Take 1 tablet (10 mg total) by mouth once daily. 90 tablet 3    glimepiride (AMARYL) 4 MG tablet Take 1 tablet (4 mg total) by mouth 2 (two) times a day. 180 tablet 3    insulin (BASAGLAR KWIKPEN U-100 INSULIN) glargine 100 units/mL SubQ pen Inject 30 Units into the skin once daily. 27 mL 3    insulin glargine 100 units/mL SubQ pen Inject 36 Units into the skin every evening.      lisinopriL (PRINIVIL,ZESTRIL) 40 MG tablet Take 40 mg by mouth once daily.      magnesium oxide (MAG-OX) 400 mg (241.3 mg magnesium) tablet Take 1 tablet (400 mg total) by mouth 2 (two) times daily. 180 tablet 3    meclizine (ANTIVERT) 25 mg tablet Take 25 mg by mouth 2 (two) times daily as needed for Dizziness.      metoprolol tartrate (LOPRESSOR) 50 MG tablet TAKE 1 TABLET(50 MG) BY MOUTH TWICE DAILY 180 tablet 1    pen needle, diabetic 32 gauge x 5/32" Ndle 1 pen  by Misc.(Non-Drug; Combo Route) route Daily. 200 each 3    potassium chloride (MICRO-K) 10 MEQ CpSR Take 10 mEq by mouth 2 (two) times daily.      simvastatin (ZOCOR) 40 MG tablet Take 40 mg by mouth every evening.      torsemide (DEMADEX) 10 MG Tab Take 2 tablets (20 mg total) by mouth once daily. 90 tablet 3     No current facility-administered medications for this visit.     Social History     Socioeconomic History    Marital status:    Tobacco Use    Smoking status: Never    Smokeless tobacco: Never   Substance and Sexual Activity    Alcohol use: Not Currently    Drug use: Never    Sexual activity: Not Currently      History reviewed. No pertinent family history.     Objective:      Physical Exam  Constitutional:       Appearance: She is well-developed.   HENT:      Head: Normocephalic and atraumatic.   Eyes:      General: No scleral icterus.     Conjunctiva/sclera: Conjunctivae normal.      Pupils: Pupils are equal, round, and " reactive to light.   Neck:      Thyroid: No thyroid mass.      Vascular: No carotid bruit.      Trachea: Trachea normal.   Cardiovascular:      Rate and Rhythm: Normal rate and regular rhythm.   Pulmonary:      Effort: Pulmonary effort is normal.      Breath sounds: Normal breath sounds.   Musculoskeletal:      Cervical back: Normal range of motion and neck supple.   Neurological:      Mental Status: She is alert and oriented to person, place, and time.   Psychiatric:         Mood and Affect: Mood normal.         Behavior: Behavior normal.         Assessment:     1. Type 2 diabetes mellitus with hypoglycemia without coma, with long-term current use of insulin Stable   2. Essential hypertension Stable   3. Mixed hyperlipidemia Stable   4. Coronary artery disease involving native heart without angina pectoris, unspecified vessel or lesion type Stable     Plan:       PROBLEM LIST     Type 2 diabetes mellitus with hypoglycemia without coma, with long-term current use of insulin    Essential hypertension    Mixed hyperlipidemia    Coronary artery disease involving native heart without angina pectoris, unspecified vessel or lesion type        No more hypoglycemic events since discontinuing her metformin 4 weeks ago; we will be retesting A1c in 8 weeks.

## 2024-02-12 DIAGNOSIS — I10 ESSENTIAL HYPERTENSION: ICD-10-CM

## 2024-02-12 RX ORDER — METOPROLOL TARTRATE 50 MG/1
50 TABLET ORAL 2 TIMES DAILY
Qty: 180 TABLET | Refills: 1 | Status: SHIPPED | OUTPATIENT
Start: 2024-02-12 | End: 2024-05-15

## 2024-04-03 ENCOUNTER — OFFICE VISIT (OUTPATIENT)
Dept: FAMILY MEDICINE | Facility: CLINIC | Age: 79
End: 2024-04-03
Payer: MEDICARE

## 2024-04-03 VITALS
WEIGHT: 200.81 LBS | HEIGHT: 64 IN | DIASTOLIC BLOOD PRESSURE: 70 MMHG | HEART RATE: 63 BPM | RESPIRATION RATE: 15 BRPM | BODY MASS INDEX: 34.28 KG/M2 | OXYGEN SATURATION: 99 % | SYSTOLIC BLOOD PRESSURE: 124 MMHG

## 2024-04-03 DIAGNOSIS — Z79.4 TYPE 2 DIABETES MELLITUS WITH HYPOGLYCEMIA WITHOUT COMA, WITH LONG-TERM CURRENT USE OF INSULIN: Primary | Chronic | ICD-10-CM

## 2024-04-03 DIAGNOSIS — Z23 NEED FOR PROPHYLACTIC VACCINATION WITH STREPTOCOCCUS PNEUMONIAE (PNEUMOCOCCUS) AND INFLUENZA VACCINES: ICD-10-CM

## 2024-04-03 DIAGNOSIS — I10 ESSENTIAL HYPERTENSION: Chronic | ICD-10-CM

## 2024-04-03 DIAGNOSIS — E11.649 TYPE 2 DIABETES MELLITUS WITH HYPOGLYCEMIA WITHOUT COMA, WITH LONG-TERM CURRENT USE OF INSULIN: Primary | Chronic | ICD-10-CM

## 2024-04-03 LAB — HBA1C MFR BLD: 10 %

## 2024-04-03 PROCEDURE — 83036 HEMOGLOBIN GLYCOSYLATED A1C: CPT | Mod: QW,S$GLB,, | Performed by: NURSE PRACTITIONER

## 2024-04-03 PROCEDURE — 90677 PCV20 VACCINE IM: CPT | Mod: S$GLB,,, | Performed by: NURSE PRACTITIONER

## 2024-04-03 PROCEDURE — 99214 OFFICE O/P EST MOD 30 MIN: CPT | Mod: 25,S$GLB,, | Performed by: NURSE PRACTITIONER

## 2024-04-03 PROCEDURE — G0009 ADMIN PNEUMOCOCCAL VACCINE: HCPCS | Mod: S$GLB,,, | Performed by: NURSE PRACTITIONER

## 2024-04-03 RX ORDER — METFORMIN HYDROCHLORIDE 500 MG/1
500 TABLET ORAL 2 TIMES DAILY WITH MEALS
Qty: 180 TABLET | Refills: 3 | Status: SHIPPED | OUTPATIENT
Start: 2024-04-03 | End: 2025-04-03

## 2024-04-03 NOTE — PROGRESS NOTES
Subjective:       Patient ID: Lorraine Calderon is a 78 y.o. female.    Chief Complaint: Med Change Follow Up (Pt states she has been checking her sugar at home and stays between 300-340)    She is retired. She is not a smoker. She has a PMH HTN, HLD, CAD, valvular disease, and DM2.      She is now established with cardiologist Dr Rodrigues for surveillance CAD.       DM2 F/u  She is compliant with all her medications; her last A1c was 7.1%; She is compliant with all her diabetic medications and diet. At her previous appt, she had reported a hypoglycemic event. Her metformin was discontinued... but her other diabetic meds were resumed. Since stopping metformin, her evening glucose levels have been increasing to 300-340. Today her A1c is 10%.         Review of Systems   Constitutional:  Negative for appetite change and fever.   Respiratory:  Negative for chest tightness and shortness of breath.    Cardiovascular:  Negative for chest pain and palpitations.   Gastrointestinal:  Negative for abdominal pain, nausea and vomiting.   Neurological:  Negative for dizziness, light-headedness and headaches.   Psychiatric/Behavioral:  Negative for dysphoric mood and sleep disturbance. The patient is not nervous/anxious.            Past Medical History:  Past Medical History:   Diagnosis Date    Diabetes mellitus, type 2     Hyperlipidemia     Hypertension       Past Surgical History:   Procedure Laterality Date    AORTIC VALVE REPLACEMENT  12/01/2013    ATRIAL SEPTECTOMY OR SEPTOSTOMY, OPEN HEART WITH CARDIOPULMONARY BYPASS      CORONARY ARTERY BYPASS GRAFT  11/01/2009    EYE SURGERY        Review of patient's allergies indicates:  No Known Allergies   Current Outpatient Medications   Medication Sig Dispense Refill    aspirin 81 MG Chew Take 1 tablet (81 mg total) by mouth once daily. 90 tablet 3    clindamycin (CLEOCIN) 150 MG capsule Take 150 mg by mouth. 4 tablets by mouth before dental appointment      empagliflozin  "(JARDIANCE) 10 mg tablet Take 1 tablet (10 mg total) by mouth once daily. 90 tablet 3    glimepiride (AMARYL) 4 MG tablet Take 1 tablet (4 mg total) by mouth 2 (two) times a day. 180 tablet 3    insulin (BASAGLAR KWIKPEN U-100 INSULIN) glargine 100 units/mL SubQ pen Inject 30 Units into the skin once daily. 27 mL 3    insulin glargine 100 units/mL SubQ pen Inject 36 Units into the skin every evening.      lisinopriL (PRINIVIL,ZESTRIL) 40 MG tablet Take 40 mg by mouth once daily.      magnesium oxide (MAG-OX) 400 mg (241.3 mg magnesium) tablet Take 1 tablet (400 mg total) by mouth 2 (two) times daily. 180 tablet 3    meclizine (ANTIVERT) 25 mg tablet Take 25 mg by mouth 2 (two) times daily as needed for Dizziness.      metFORMIN (GLUCOPHAGE) 500 MG tablet Take 1 tablet (500 mg total) by mouth 2 (two) times daily with meals. 180 tablet 3    metoprolol tartrate (LOPRESSOR) 50 MG tablet Take 1 tablet (50 mg total) by mouth 2 (two) times daily. 180 tablet 1    pen needle, diabetic 32 gauge x 5/32" Ndle 1 pen  by Misc.(Non-Drug; Combo Route) route Daily. 200 each 3    potassium chloride (MICRO-K) 10 MEQ CpSR Take 10 mEq by mouth 2 (two) times daily.      simvastatin (ZOCOR) 40 MG tablet Take 40 mg by mouth every evening.      torsemide (DEMADEX) 10 MG Tab Take 2 tablets (20 mg total) by mouth once daily. 90 tablet 3     No current facility-administered medications for this visit.     Social History     Socioeconomic History    Marital status:    Tobacco Use    Smoking status: Never    Smokeless tobacco: Never   Substance and Sexual Activity    Alcohol use: Not Currently    Drug use: Never    Sexual activity: Not Currently      History reviewed. No pertinent family history.     Objective:      Physical Exam  Constitutional:       Appearance: She is well-developed.   HENT:      Head: Normocephalic and atraumatic.   Eyes:      General: No scleral icterus.     Conjunctiva/sclera: Conjunctivae normal.      Pupils: " Pupils are equal, round, and reactive to light.   Neck:      Thyroid: No thyroid mass.      Vascular: No carotid bruit.      Trachea: Trachea normal.   Cardiovascular:      Rate and Rhythm: Normal rate and regular rhythm.   Pulmonary:      Effort: Pulmonary effort is normal.      Breath sounds: Normal breath sounds.   Musculoskeletal:      Cervical back: Normal range of motion and neck supple.   Neurological:      Mental Status: She is alert and oriented to person, place, and time.   Psychiatric:         Mood and Affect: Mood normal.         Behavior: Behavior normal.         Assessment:     1. Type 2 diabetes mellitus with hypoglycemia without coma, with long-term current use of insulin Active   2. Essential hypertension Stable   3. Need for prophylactic vaccination with Streptococcus pneumoniae (Pneumococcus) and Influenza vaccines      Plan:       PROBLEM LIST     Type 2 diabetes mellitus with hypoglycemia without coma, with long-term current use of insulin  Comments:  restarting metformin 500 mg BID; RTC in 12 wk for repeat A1c; ophthalmology referral ordered  Orders:  -     POCT HEMOGLOBIN A1C  -     metFORMIN (GLUCOPHAGE) 500 MG tablet; Take 1 tablet (500 mg total) by mouth 2 (two) times daily with meals.  Dispense: 180 tablet; Refill: 3  -     Ambulatory referral/consult to Ophthalmology; Future; Expected date: 04/10/2024    Essential hypertension  Comments:  BP remains at goal; continue current regimen    Need for prophylactic vaccination with Streptococcus pneumoniae (Pneumococcus) and Influenza vaccines  -     (In Office Administered) Pneumococcal Conjugate Vaccine (20 Valent) (IM) (Preferred)

## 2024-05-14 DIAGNOSIS — I10 ESSENTIAL HYPERTENSION: ICD-10-CM

## 2024-05-15 RX ORDER — METOPROLOL TARTRATE 50 MG/1
50 TABLET ORAL 2 TIMES DAILY
Qty: 180 TABLET | Refills: 1 | Status: SHIPPED | OUTPATIENT
Start: 2024-05-15

## 2024-06-26 ENCOUNTER — TELEPHONE (OUTPATIENT)
Dept: FAMILY MEDICINE | Facility: CLINIC | Age: 79
End: 2024-06-26

## 2024-06-26 ENCOUNTER — OFFICE VISIT (OUTPATIENT)
Dept: FAMILY MEDICINE | Facility: CLINIC | Age: 79
End: 2024-06-26
Payer: MEDICARE

## 2024-06-26 VITALS
TEMPERATURE: 98 F | WEIGHT: 197 LBS | BODY MASS INDEX: 33.63 KG/M2 | DIASTOLIC BLOOD PRESSURE: 60 MMHG | HEIGHT: 64 IN | SYSTOLIC BLOOD PRESSURE: 112 MMHG | OXYGEN SATURATION: 95 % | HEART RATE: 87 BPM | RESPIRATION RATE: 16 BRPM

## 2024-06-26 DIAGNOSIS — E78.2 MIXED HYPERLIPIDEMIA: Chronic | ICD-10-CM

## 2024-06-26 DIAGNOSIS — E66.09 CLASS 1 OBESITY DUE TO EXCESS CALORIES WITH SERIOUS COMORBIDITY AND BODY MASS INDEX (BMI) OF 33.0 TO 33.9 IN ADULT: Chronic | ICD-10-CM

## 2024-06-26 DIAGNOSIS — Z79.4 TYPE 2 DIABETES MELLITUS WITH HYPERGLYCEMIA, WITH LONG-TERM CURRENT USE OF INSULIN: Primary | Chronic | ICD-10-CM

## 2024-06-26 DIAGNOSIS — I25.10 CORONARY ARTERY DISEASE INVOLVING NATIVE HEART WITHOUT ANGINA PECTORIS, UNSPECIFIED VESSEL OR LESION TYPE: Chronic | ICD-10-CM

## 2024-06-26 DIAGNOSIS — E11.65 TYPE 2 DIABETES MELLITUS WITH HYPERGLYCEMIA, WITH LONG-TERM CURRENT USE OF INSULIN: Primary | Chronic | ICD-10-CM

## 2024-06-26 DIAGNOSIS — I10 ESSENTIAL HYPERTENSION: Chronic | ICD-10-CM

## 2024-06-26 LAB
ABS NRBC COUNT: 0 THOU/UL (ref 0–0.01)
ABSOLUTE BASOPHIL: 0.1 10*3/UL (ref 0–0.3)
ABSOLUTE EOSINOPHIL: 0.5 10*3/UL (ref 0–0.6)
ABSOLUTE IMMATURE GRAN: 0.02 THOU/UL (ref 0–0.03)
ABSOLUTE LYMPHOCYTE: 1.4 10*3/UL (ref 1.2–4)
ABSOLUTE MONOCYTE: 0.5 10*3/UL (ref 0.1–0.8)
ALBUMIN SERPL BCP-MCNC: 3.5 G/DL (ref 3.4–5)
ALP SERPL-CCNC: 73 U/L (ref 45–117)
ALT SERPL W P-5'-P-CCNC: 20 U/L (ref 13–56)
ANION GAP SERPL CALC-SCNC: 2 MMOL/L (ref 3–11)
AST SERPL-CCNC: 17 U/L (ref 15–37)
BASOPHILS NFR BLD: 0.8 % (ref 0–3)
BILIRUB SERPL-MCNC: 0.5 MG/DL (ref 0.2–1)
BUN SERPL-MCNC: 33 MG/DL (ref 7–18)
BUN/CREAT SERPL: 28.94 RATIO
CALCIUM SERPL-MCNC: 9.1 MG/DL (ref 8.5–10.1)
CHLORIDE SERPL-SCNC: 101 MMOL/L (ref 98–107)
CHOLEST SERPL-MSCNC: 155 MG/DL
CO2 SERPL-SCNC: 30 MMOL/L (ref 21–32)
CREAT SERPL-MCNC: 1.14 MG/DL (ref 0.55–1.02)
CREATININE, URINE: 84.4 MG/DL (ref 10–175)
EOSINOPHIL NFR BLD: 7.3 % (ref 0–6)
ERYTHROCYTE [DISTWIDTH] IN BLOOD BY AUTOMATED COUNT: 13.8 % (ref 0–15.5)
GFR ESTIMATION: 46
GLUCOSE SERPL-MCNC: 188 MG/DL (ref 74–106)
HBA1C MFR BLD: 8.7 %
HCT VFR BLD AUTO: 44.6 % (ref 37–47)
HDLC SERPL-MCNC: 40 MG/DL
HGB BLD-MCNC: 14 G/DL (ref 12–16)
IMMATURE GRANULOCYTES: 0.3 % (ref 0–0.43)
LDLC SERPL CALC-MCNC: 62.6 MG/DL
LYMPHOCYTES NFR BLD: 18.6 % (ref 20–45)
MCH RBC QN AUTO: 29.2 PG (ref 27–32)
MCHC RBC AUTO-ENTMCNC: 31.4 % (ref 32–36)
MCV RBC AUTO: 93.1 FL (ref 80–99)
MICROALBUMIN URINE: 5.1 MG/L (ref 0–20)
MICROALBUMIN/CREATININE RATIO: 6 MG/G (ref 0–30)
MONOCYTES NFR BLD: 7.2 % (ref 2–10)
NEUTROPHILS # BLD AUTO: 4.8 10*3/UL (ref 1.4–7)
NEUTROPHILS NFR BLD: 65.8 % (ref 50–80)
NUCLEATED RED BLOOD CELLS: 0 % (ref 0–0.2)
PLATELETS: 220 10*3/UL (ref 130–400)
PMV BLD AUTO: 11.7 FL (ref 9.2–12.2)
POTASSIUM SERPL-SCNC: 4.8 MMOL/L (ref 3.5–5.1)
PROT SERPL-MCNC: 7.2 G/DL (ref 6.4–8.2)
RBC # BLD AUTO: 4.79 10*6/UL (ref 4.2–5.4)
SODIUM BLD-SCNC: 133 MMOL/L (ref 131–143)
T4 FREE SP9 P CHAL SERPL-SCNC: 1.12 NG/DL (ref 0.76–1.46)
TRIGL SERPL-MCNC: 262 MG/DL (ref 0–149)
TSH SERPL DL<=0.005 MIU/L-ACNC: 1.83 UIU/ML (ref 0.36–3.74)
VLDL CHOLESTEROL: 52 MG/DL
WBC # BLD: 7.3 10*3/UL (ref 4.5–10)

## 2024-06-26 PROCEDURE — 83036 HEMOGLOBIN GLYCOSYLATED A1C: CPT | Mod: QW,,, | Performed by: NURSE PRACTITIONER

## 2024-06-26 NOTE — TELEPHONE ENCOUNTER
----- Message from Dez Ross sent at 6/26/2024 11:58 AM CDT -----  Contact: Luis Manuel Peters called in regards to she said the diagnosis from today visit she see where it says her eye exam was 5/19/24 and it was 6/19/24 and asking can that be changed. Call back [phone]

## 2024-06-26 NOTE — PROGRESS NOTES
Subjective:       Patient ID: Lorraine Calderon is a 78 y.o. female.    Chief Complaint: Follow-up (Pt is here for her 6 month follow up and request her yearly labs. )    She is retired. She is not a smoker. She has a PMH HTN, HLD, CAD, valvular disease, and DM2.      She is now established with cardiologist Dr Rodrigues for surveillance CAD.       DM2 F/u  She is compliant with all her medications; her A1c was staying controlled at 7.1%; She is compliant with all her diabetic medications and diet. At her previous appt, she had reported a hypoglycemic event. Her metformin was discontinued... but her other diabetic meds were resumed. Since stopping metformin, her evening glucose levels started to increase to 300-340. In April 2024 her A1c was 10%. Her metformin was restarted in April 2024. Today her A1c is 8.7%    At her last appt, ophthalmology referral placed to obtain annual diabetic eye exam. She completed diabetic eye exam at [facility] June 19th. We will obtain these records.                 Review of Systems   Constitutional:  Negative for appetite change and fever.   Respiratory:  Negative for chest tightness and shortness of breath.    Cardiovascular:  Negative for chest pain and palpitations.   Gastrointestinal:  Negative for abdominal pain, nausea and vomiting.   Neurological:  Negative for dizziness, light-headedness and headaches.   Psychiatric/Behavioral:  Negative for dysphoric mood and sleep disturbance. The patient is not nervous/anxious.            Past Medical History:  Past Medical History:   Diagnosis Date    Diabetes mellitus, type 2     Hyperlipidemia     Hypertension       Past Surgical History:   Procedure Laterality Date    AORTIC VALVE REPLACEMENT  12/01/2013    ATRIAL SEPTECTOMY OR SEPTOSTOMY, OPEN HEART WITH CARDIOPULMONARY BYPASS      CORONARY ARTERY BYPASS GRAFT  11/01/2009    EYE SURGERY        Review of patient's allergies indicates:  No Known Allergies   Current  "Outpatient Medications   Medication Sig Dispense Refill    aspirin 81 MG Chew Take 1 tablet (81 mg total) by mouth once daily. 90 tablet 3    empagliflozin (JARDIANCE) 25 mg tablet Take 1 tablet (25 mg total) by mouth once daily. 90 tablet 3    glimepiride (AMARYL) 4 MG tablet Take 1 tablet (4 mg total) by mouth 2 (two) times a day. 180 tablet 3    insulin (BASAGLAR KWIKPEN U-100 INSULIN) glargine 100 units/mL SubQ pen Inject 30 Units into the skin once daily. 27 mL 3    insulin glargine 100 units/mL SubQ pen Inject 36 Units into the skin every evening.      lisinopriL (PRINIVIL,ZESTRIL) 40 MG tablet Take 40 mg by mouth once daily.      magnesium oxide (MAG-OX) 400 mg (241.3 mg magnesium) tablet Take 1 tablet (400 mg total) by mouth 2 (two) times daily. 180 tablet 3    meclizine (ANTIVERT) 25 mg tablet Take 25 mg by mouth 2 (two) times daily as needed for Dizziness.      metFORMIN (GLUCOPHAGE) 500 MG tablet Take 1 tablet (500 mg total) by mouth 2 (two) times daily with meals. 180 tablet 3    metoprolol tartrate (LOPRESSOR) 50 MG tablet TAKE 1 TABLET(50 MG) BY MOUTH TWICE DAILY 180 tablet 1    pen needle, diabetic 32 gauge x 5/32" Ndle 1 pen  by Misc.(Non-Drug; Combo Route) route Daily. 200 each 3    potassium chloride (MICRO-K) 10 MEQ CpSR Take 10 mEq by mouth 2 (two) times daily.      simvastatin (ZOCOR) 40 MG tablet Take 40 mg by mouth every evening.      torsemide (DEMADEX) 10 MG Tab Take 2 tablets (20 mg total) by mouth once daily. 90 tablet 3     No current facility-administered medications for this visit.     Social History     Socioeconomic History    Marital status:    Tobacco Use    Smoking status: Never    Smokeless tobacco: Never   Substance and Sexual Activity    Alcohol use: Not Currently    Drug use: Never    Sexual activity: Not Currently      No family history on file.     Objective:      Physical Exam  Constitutional:       Appearance: She is well-developed.   HENT:      Head: Normocephalic " and atraumatic.   Eyes:      General: No scleral icterus.     Conjunctiva/sclera: Conjunctivae normal.      Pupils: Pupils are equal, round, and reactive to light.   Neck:      Thyroid: No thyroid mass.      Vascular: No carotid bruit.      Trachea: Trachea normal.   Pulmonary:      Effort: Pulmonary effort is normal.   Musculoskeletal:      Cervical back: Normal range of motion and neck supple.   Neurological:      Mental Status: She is alert and oriented to person, place, and time.   Psychiatric:         Mood and Affect: Mood normal.         Behavior: Behavior normal.         Assessment:     1. Type 2 diabetes mellitus with hyperglycemia, with long-term current use of insulin Active   2. Essential hypertension Stable   3. Mixed hyperlipidemia Stable   4. Coronary artery disease involving native heart without angina pectoris, unspecified vessel or lesion type Stable   5. Class 1 obesity due to excess calories with serious comorbidity and body mass index (BMI) of 33.0 to 33.9 in adult Active     Plan:       PROBLEM LIST     Type 2 diabetes mellitus with hyperglycemia, with long-term current use of insulin  Comments:  A1c was 10%, now A1c 8.7%; increasing her Jardiance from 10 mg to 25 mg. She will RTC in 12 wk for A1c check; diabetic eye exam completed May 19 2024  Orders:  -     Hemoglobin A1C, POCT  -     CBC Auto Differential  -     Comprehensive Metabolic Panel  -     Lipid Panel  -     TSH w/reflex to FT4  -     Microalbumin/Creatinine Ratio, urine  -     empagliflozin (JARDIANCE) 25 mg tablet; Take 1 tablet (25 mg total) by mouth once daily.  Dispense: 90 tablet; Refill: 3    Essential hypertension  Comments:  BP remains at goal  Orders:  -     CBC Auto Differential  -     Comprehensive Metabolic Panel  -     Lipid Panel  -     TSH w/reflex to FT4    Mixed hyperlipidemia  Comments:  obtaining FLP today; continue statin hs    Coronary artery disease involving native heart without angina pectoris, unspecified  vessel or lesion type  Comments:  keep upcoming appt with Dr Rodrigues    Class 1 obesity due to excess calories with serious comorbidity and body mass index (BMI) of 33.0 to 33.9 in adult  Comments:  adhere to ADA diet; recommend walking 30 min daily

## 2024-07-01 ENCOUNTER — PATIENT MESSAGE (OUTPATIENT)
Dept: FAMILY MEDICINE | Facility: CLINIC | Age: 79
End: 2024-07-01
Payer: MEDICARE

## 2024-08-08 DIAGNOSIS — Z79.4 TYPE 2 DIABETES MELLITUS WITHOUT COMPLICATION, WITH LONG-TERM CURRENT USE OF INSULIN: Chronic | ICD-10-CM

## 2024-08-08 DIAGNOSIS — E11.9 TYPE 2 DIABETES MELLITUS WITHOUT COMPLICATION, WITH LONG-TERM CURRENT USE OF INSULIN: Chronic | ICD-10-CM

## 2024-08-09 RX ORDER — GLIMEPIRIDE 4 MG/1
4 TABLET ORAL 2 TIMES DAILY
Qty: 180 TABLET | Refills: 3 | Status: SHIPPED | OUTPATIENT
Start: 2024-08-09

## 2024-08-29 RX ORDER — INSULIN GLARGINE 100 [IU]/ML
INJECTION, SOLUTION SUBCUTANEOUS
Qty: 27 ML | Refills: 3 | Status: SHIPPED | OUTPATIENT
Start: 2024-08-29

## 2024-09-26 ENCOUNTER — OFFICE VISIT (OUTPATIENT)
Dept: FAMILY MEDICINE | Facility: CLINIC | Age: 79
End: 2024-09-26
Payer: MEDICARE

## 2024-09-26 VITALS
TEMPERATURE: 99 F | DIASTOLIC BLOOD PRESSURE: 60 MMHG | RESPIRATION RATE: 16 BRPM | HEART RATE: 84 BPM | BODY MASS INDEX: 33.63 KG/M2 | OXYGEN SATURATION: 97 % | SYSTOLIC BLOOD PRESSURE: 118 MMHG | HEIGHT: 64 IN | WEIGHT: 197 LBS

## 2024-09-26 DIAGNOSIS — Z79.4 TYPE 2 DIABETES MELLITUS WITHOUT COMPLICATION, WITH LONG-TERM CURRENT USE OF INSULIN: Primary | Chronic | ICD-10-CM

## 2024-09-26 DIAGNOSIS — I10 ESSENTIAL HYPERTENSION: Chronic | ICD-10-CM

## 2024-09-26 DIAGNOSIS — E11.9 TYPE 2 DIABETES MELLITUS WITHOUT COMPLICATION, WITH LONG-TERM CURRENT USE OF INSULIN: Primary | Chronic | ICD-10-CM

## 2024-09-26 PROCEDURE — 99214 OFFICE O/P EST MOD 30 MIN: CPT | Mod: S$GLB,,, | Performed by: NURSE PRACTITIONER

## 2024-09-26 RX ORDER — METFORMIN HYDROCHLORIDE 850 MG/1
850 TABLET ORAL 2 TIMES DAILY WITH MEALS
Qty: 180 TABLET | Refills: 3 | Status: SHIPPED | OUTPATIENT
Start: 2024-09-26 | End: 2025-09-26

## 2024-09-26 NOTE — PROGRESS NOTES
Subjective:       Patient ID: Lorraine Calderon is a 79 y.o. female.    Chief Complaint: Follow-up (Pt is here for a routine follow up )    PMH HTN, HLD, CAD, valvular disease, and DM2.      She is now established with cardiologist Dr Rodrigues for surveillance CAD.       DM2 F/u  She is compliant with all her diabetic medications and diet. At her previous appt, she had reported a hypoglycemic event. Her metformin was discontinued... but her other diabetic meds were resumed. Since stopping metformin, her evening glucose levels started to increase to 300-340. In April 2024 her A1c was 10%. Her metformin was restarted in April 2024. In June, her A1c is 8.7%; today her A1c is 8.1%        Review of Systems   Constitutional:  Negative for appetite change and fever.   Respiratory:  Negative for chest tightness and shortness of breath.    Cardiovascular:  Negative for chest pain and palpitations.   Gastrointestinal:  Negative for abdominal pain, nausea and vomiting.   Neurological:  Negative for dizziness, light-headedness and headaches.   Psychiatric/Behavioral:  Negative for dysphoric mood and sleep disturbance. The patient is not nervous/anxious.            Past Medical History:  Past Medical History:   Diagnosis Date    Diabetes mellitus, type 2     Hyperlipidemia     Hypertension       Past Surgical History:   Procedure Laterality Date    AORTIC VALVE REPLACEMENT  12/01/2013    ATRIAL SEPTECTOMY OR SEPTOSTOMY, OPEN HEART WITH CARDIOPULMONARY BYPASS      CORONARY ARTERY BYPASS GRAFT  11/01/2009    EYE SURGERY        Review of patient's allergies indicates:  No Known Allergies   Current Outpatient Medications   Medication Sig Dispense Refill    aspirin 81 MG Chew Take 1 tablet (81 mg total) by mouth once daily. 90 tablet 3    empagliflozin (JARDIANCE) 25 mg tablet Take 1 tablet (25 mg total) by mouth once daily. 90 tablet 3    glimepiride (AMARYL) 4 MG tablet TAKE 1 TABLET(4 MG) BY MOUTH TWICE DAILY 180 tablet 3  "   insulin (BASAGLAR KWIKPEN U-100 INSULIN) glargine 100 units/mL SubQ pen Inject 30 Units into the skin once daily. 27 mL 3    insulin glargine 100 units/mL SubQ pen Inject 36 Units into the skin every evening.      insulin glargine U-100, Lantus, (BASAGLAR KWIKPEN U-100 INSULIN) 100 unit/mL (3 mL) InPn pen INJECT 30 UNITS UNDER THE SKIN ONCE DAILY 27 mL 3    lisinopriL (PRINIVIL,ZESTRIL) 40 MG tablet Take 40 mg by mouth once daily.      magnesium oxide (MAG-OX) 400 mg (241.3 mg magnesium) tablet Take 1 tablet (400 mg total) by mouth 2 (two) times daily. 180 tablet 3    meclizine (ANTIVERT) 25 mg tablet Take 25 mg by mouth 2 (two) times daily as needed for Dizziness.      metFORMIN (GLUCOPHAGE) 850 MG tablet Take 1 tablet (850 mg total) by mouth 2 (two) times daily with meals. 180 tablet 3    metoprolol tartrate (LOPRESSOR) 50 MG tablet TAKE 1 TABLET(50 MG) BY MOUTH TWICE DAILY 180 tablet 1    pen needle, diabetic 32 gauge x 5/32" Ndle 1 pen  by Misc.(Non-Drug; Combo Route) route Daily. 200 each 3    potassium chloride (MICRO-K) 10 MEQ CpSR Take 10 mEq by mouth 2 (two) times daily.      simvastatin (ZOCOR) 40 MG tablet Take 40 mg by mouth every evening.      torsemide (DEMADEX) 10 MG Tab Take 2 tablets (20 mg total) by mouth once daily. 90 tablet 3     No current facility-administered medications for this visit.     Social History     Socioeconomic History    Marital status:    Tobacco Use    Smoking status: Never    Smokeless tobacco: Never   Substance and Sexual Activity    Alcohol use: Not Currently    Drug use: Never    Sexual activity: Not Currently      No family history on file.     Objective:      Physical Exam  Constitutional:       Appearance: She is well-developed.   HENT:      Head: Normocephalic and atraumatic.   Eyes:      General: No scleral icterus.     Conjunctiva/sclera: Conjunctivae normal.      Pupils: Pupils are equal, round, and reactive to light.   Neck:      Thyroid: No thyroid mass. "      Vascular: No carotid bruit.      Trachea: Trachea normal.   Pulmonary:      Effort: Pulmonary effort is normal.   Musculoskeletal:      Cervical back: Normal range of motion and neck supple.   Neurological:      Mental Status: She is alert and oriented to person, place, and time.   Psychiatric:         Mood and Affect: Mood normal.         Behavior: Behavior normal.         Assessment:     1. Type 2 diabetes mellitus without complication, with long-term current use of insulin Stable   2. Essential hypertension Stable     Plan:       PROBLEM LIST     Type 2 diabetes mellitus without complication, with long-term current use of insulin  Comments:  A1c 8.1%; increasing her metformin from 500 mg to 850 mg BID; Repeat A1c in 3 mo.  Orders:  -     metFORMIN (GLUCOPHAGE) 850 MG tablet; Take 1 tablet (850 mg total) by mouth 2 (two) times daily with meals.  Dispense: 180 tablet; Refill: 3    Essential hypertension  Comments:  BP at goal

## 2024-11-27 DIAGNOSIS — E11.9 TYPE 2 DIABETES MELLITUS WITHOUT COMPLICATION, WITH LONG-TERM CURRENT USE OF INSULIN: ICD-10-CM

## 2024-11-27 DIAGNOSIS — Z79.4 TYPE 2 DIABETES MELLITUS WITHOUT COMPLICATION, WITH LONG-TERM CURRENT USE OF INSULIN: ICD-10-CM

## 2024-11-27 RX ORDER — PEN NEEDLE, DIABETIC 32GX 5/32"
NEEDLE, DISPOSABLE MISCELLANEOUS
Qty: 100 EACH | Refills: 2 | Status: SHIPPED | OUTPATIENT
Start: 2024-11-27

## 2024-12-10 DIAGNOSIS — Z76.0 ENCOUNTER FOR MEDICATION REFILL: ICD-10-CM

## 2024-12-10 NOTE — TELEPHONE ENCOUNTER
----- Message from Allie sent at 12/10/2024  2:21 PM CST -----  Contact: self  Type:  RX Refill Request    Who Called: Lorraine Calderon  Refill or New Rx:refill  RX Name and Strength:magnesium oxide (MAG-OX) 400 mg (241.3 mg magnesium) tablet  How is the patient currently taking it? (ex. 1XDay):daily 2 x  Is this a 30 day or 90 day RX:180  Preferred Pharmacy with phone number:  [id number] - [place], [place] - [address] AT  &   [address]  [facility] [address]  Phone: [phone] Fax: [phone]    Local or Mail Order:local  Ordering Provider:bhavani  Would the patient rather a call back or a response via [facility]? zane  Best Call Back Number:[phone]  Additional Information: n/a

## 2024-12-11 RX ORDER — LANOLIN ALCOHOL/MO/W.PET/CERES
400 CREAM (GRAM) TOPICAL 2 TIMES DAILY
Qty: 180 TABLET | Refills: 3 | Status: SHIPPED | OUTPATIENT
Start: 2024-12-11

## 2024-12-26 ENCOUNTER — OFFICE VISIT (OUTPATIENT)
Dept: FAMILY MEDICINE | Facility: CLINIC | Age: 79
End: 2024-12-26
Payer: MEDICARE

## 2024-12-26 VITALS
OXYGEN SATURATION: 97 % | BODY MASS INDEX: 34.31 KG/M2 | RESPIRATION RATE: 18 BRPM | HEIGHT: 64 IN | TEMPERATURE: 99 F | WEIGHT: 201 LBS | HEART RATE: 76 BPM

## 2024-12-26 DIAGNOSIS — Z79.4 TYPE 2 DIABETES MELLITUS WITH HYPERGLYCEMIA, WITH LONG-TERM CURRENT USE OF INSULIN: Primary | Chronic | ICD-10-CM

## 2024-12-26 DIAGNOSIS — E11.65 TYPE 2 DIABETES MELLITUS WITH HYPERGLYCEMIA, WITH LONG-TERM CURRENT USE OF INSULIN: Primary | Chronic | ICD-10-CM

## 2024-12-26 PROBLEM — E11.29 TYPE 2 DIABETES MELLITUS WITH KIDNEY COMPLICATION, WITH LONG-TERM CURRENT USE OF INSULIN: Status: ACTIVE | Noted: 2023-04-20

## 2024-12-26 PROCEDURE — 99213 OFFICE O/P EST LOW 20 MIN: CPT | Mod: 25,S$PBB,, | Performed by: NURSE PRACTITIONER

## 2024-12-26 RX ORDER — DAPAGLIFLOZIN 10 MG/1
10 TABLET, FILM COATED ORAL DAILY
Qty: 90 TABLET | Refills: 3 | Status: SHIPPED | OUTPATIENT
Start: 2024-12-26

## 2024-12-26 NOTE — PROGRESS NOTES
Subjective:       Patient ID: Lorraine Calderon is a 79 y.o. female.    Chief Complaint: Follow-up (Pt is here for a follow up and A1c check )    PMH HTN, HLD, CAD, valvular disease, and DM2.      She is now established with cardiologist Dr Rodrigues for surveillance CAD.       DM2 F/u  She is compliant with all her diabetic medications and diet. In April, her A1c was 10.0; At her previous appt 9/26/24, her A1c was 8.1%; we adjusted dosing of her metformin and added Jardiance; Today her A1c is 7.9%; Jardiance is being removed from her insurance drug formulary in 2025. Farxiga is covered.    Ophthalmologist appt April 1 [facility].         Review of Systems   Constitutional:  Negative for appetite change and fever.   Respiratory:  Negative for chest tightness and shortness of breath.    Cardiovascular:  Negative for chest pain and palpitations.   Gastrointestinal:  Negative for abdominal pain, nausea and vomiting.   Neurological:  Negative for dizziness, light-headedness and headaches.   Psychiatric/Behavioral:  Negative for dysphoric mood and sleep disturbance. The patient is not nervous/anxious.            Past Medical History:  Past Medical History:   Diagnosis Date    Diabetes mellitus, type 2     Hyperlipidemia     Hypertension       Past Surgical History:   Procedure Laterality Date    AORTIC VALVE REPLACEMENT  12/01/2013    ATRIAL SEPTECTOMY OR SEPTOSTOMY, OPEN HEART WITH CARDIOPULMONARY BYPASS      CORONARY ARTERY BYPASS GRAFT  11/01/2009    EYE SURGERY        Review of patient's allergies indicates:  No Known Allergies   Current Outpatient Medications   Medication Sig Dispense Refill    aspirin 81 MG Chew Take 1 tablet (81 mg total) by mouth once daily. 90 tablet 3    dapagliflozin propanediol (FARXIGA) 10 mg tablet Take 1 tablet (10 mg total) by mouth once daily. Jardiance discontinued 90 tablet 3    glimepiride (AMARYL) 4 MG tablet TAKE 1 TABLET(4 MG)  "BY MOUTH TWICE DAILY 180 tablet 3    insulin glargine U-100, Lantus, (BASAGLAR KWIKPEN U-100 INSULIN) 100 unit/mL (3 mL) InPn pen INJECT 30 UNITS UNDER THE SKIN ONCE DAILY 27 mL 3    lisinopriL (PRINIVIL,ZESTRIL) 40 MG tablet Take 40 mg by mouth once daily.      magnesium oxide (MAG-OX) 400 mg (241.3 mg magnesium) tablet Take 1 tablet (400 mg total) by mouth 2 (two) times daily. 180 tablet 3    meclizine (ANTIVERT) 25 mg tablet Take 25 mg by mouth 2 (two) times daily as needed for Dizziness.      metFORMIN (GLUCOPHAGE) 850 MG tablet Take 1 tablet (850 mg total) by mouth 2 (two) times daily with meals. 180 tablet 3    metoprolol tartrate (LOPRESSOR) 50 MG tablet TAKE 1 TABLET(50 MG) BY MOUTH TWICE DAILY 180 tablet 1    pen needle, diabetic (BD PATTY 2ND GEN PEN NEEDLE) 32 gauge x 5/32" Ndle USE AS DIRECTED 100 each 2    potassium chloride (MICRO-K) 10 MEQ CpSR Take 10 mEq by mouth 2 (two) times daily.      simvastatin (ZOCOR) 40 MG tablet Take 40 mg by mouth every evening.      torsemide (DEMADEX) 10 MG Tab Take 2 tablets (20 mg total) by mouth once daily. 90 tablet 3     No current facility-administered medications for this visit.     Social History     Socioeconomic History    Marital status:    Tobacco Use    Smoking status: Never    Smokeless tobacco: Never   Substance and Sexual Activity    Alcohol use: Not Currently    Drug use: Never    Sexual activity: Not Currently      No family history on file.     Objective:      Physical Exam  Constitutional:       Appearance: She is well-developed.   HENT:      Head: Normocephalic and atraumatic.   Eyes:      General: No scleral icterus.     Conjunctiva/sclera: Conjunctivae normal.      Pupils: Pupils are equal, round, and reactive to light.   Neck:      Thyroid: No thyroid mass.      Trachea: Trachea normal.   Pulmonary:      Effort: Pulmonary effort is normal.   Musculoskeletal:      Cervical back: Normal range of motion and neck supple.   Neurological:      " Mental Status: She is alert and oriented to person, place, and time.   Psychiatric:         Mood and Affect: Mood normal.         Behavior: Behavior normal.         Assessment:     1. Type 2 diabetes mellitus with hyperglycemia, with long-term current use of insulin Stable     Plan:       PROBLEM LIST     Type 2 diabetes mellitus with hyperglycemia, with long-term current use of insulin  Comments:  A1c improving...A1c 7.9%; Stop Jardiance, start Farxiga; Continue adhering to ADA diet; [facility]  Orders:  -     Hemoglobin A1C, POCT  -     dapagliflozin propanediol (FARXIGA) 10 mg tablet; Take 1 tablet (10 mg total) by mouth once daily. Jardiance discontinued  Dispense: 90 tablet; Refill: 3

## 2025-03-10 DIAGNOSIS — I10 ESSENTIAL HYPERTENSION: ICD-10-CM

## 2025-03-10 RX ORDER — METOPROLOL TARTRATE 50 MG/1
50 TABLET ORAL 2 TIMES DAILY
Qty: 180 TABLET | Refills: 1 | Status: SHIPPED | OUTPATIENT
Start: 2025-03-10

## 2025-03-10 NOTE — TELEPHONE ENCOUNTER
----- Message from Christine sent at 3/10/2025 10:07 AM CDT -----  Contact: pt  Type:  RX Refill RequestWho Called: ptRefill or New Rx:refillRX Name and Strength:metoprolol tartrate (LOPRESSOR) 50 MG How is the patient currently taking it? (ex. 1XDay):2@DayIs this a 30 day or 90 day RX:90Preferred Pharmacy with phone number:[id number] - [place], [place] - [id number] [address]Vfgul: [phone] Fax: [phone] or Mail Order:localOrdering Provider:Georgina the patient rather a call back or a response via [facility]? Call backBest Call Back Number:[phone] Additional Information: none

## 2025-03-20 ENCOUNTER — PATIENT MESSAGE (OUTPATIENT)
Dept: ADMINISTRATIVE | Facility: HOSPITAL | Age: 80
End: 2025-03-20
Payer: MEDICARE

## 2025-03-20 ENCOUNTER — OFFICE VISIT (OUTPATIENT)
Dept: FAMILY MEDICINE | Facility: CLINIC | Age: 80
End: 2025-03-20
Payer: MEDICARE

## 2025-03-20 VITALS
RESPIRATION RATE: 18 BRPM | TEMPERATURE: 98 F | OXYGEN SATURATION: 97 % | BODY MASS INDEX: 35 KG/M2 | WEIGHT: 205 LBS | SYSTOLIC BLOOD PRESSURE: 150 MMHG | DIASTOLIC BLOOD PRESSURE: 60 MMHG | HEIGHT: 64 IN | HEART RATE: 68 BPM

## 2025-03-20 DIAGNOSIS — N18.32 TYPE 2 DIABETES MELLITUS WITH STAGE 3B CHRONIC KIDNEY DISEASE, WITH LONG-TERM CURRENT USE OF INSULIN: Chronic | ICD-10-CM

## 2025-03-20 DIAGNOSIS — E66.01 CLASS 2 SEVERE OBESITY DUE TO EXCESS CALORIES WITH SERIOUS COMORBIDITY AND BODY MASS INDEX (BMI) OF 35.0 TO 35.9 IN ADULT: Chronic | ICD-10-CM

## 2025-03-20 DIAGNOSIS — Z79.4 TYPE 2 DIABETES MELLITUS WITH HYPERGLYCEMIA, WITH LONG-TERM CURRENT USE OF INSULIN: Primary | Chronic | ICD-10-CM

## 2025-03-20 DIAGNOSIS — E66.812 CLASS 2 SEVERE OBESITY DUE TO EXCESS CALORIES WITH SERIOUS COMORBIDITY AND BODY MASS INDEX (BMI) OF 35.0 TO 35.9 IN ADULT: Chronic | ICD-10-CM

## 2025-03-20 DIAGNOSIS — E11.22 TYPE 2 DIABETES MELLITUS WITH STAGE 3B CHRONIC KIDNEY DISEASE, WITH LONG-TERM CURRENT USE OF INSULIN: Chronic | ICD-10-CM

## 2025-03-20 DIAGNOSIS — Z79.4 TYPE 2 DIABETES MELLITUS WITH STAGE 3B CHRONIC KIDNEY DISEASE, WITH LONG-TERM CURRENT USE OF INSULIN: Chronic | ICD-10-CM

## 2025-03-20 DIAGNOSIS — E11.65 TYPE 2 DIABETES MELLITUS WITH HYPERGLYCEMIA, WITH LONG-TERM CURRENT USE OF INSULIN: Primary | Chronic | ICD-10-CM

## 2025-03-20 LAB — HBA1C MFR BLD: 9.4 %

## 2025-03-20 PROCEDURE — 99213 OFFICE O/P EST LOW 20 MIN: CPT | Mod: 25,S$PBB,, | Performed by: NURSE PRACTITIONER

## 2025-03-20 NOTE — PROGRESS NOTES
Subjective:       Patient ID: Lorraine Calderon is a 79 y.o. female.    Chief Complaint: Follow-up (Pt is here for a 3 month follow up, reports she can tell by the way her eyes feel that her bp might be high )    PMH HTN, HLD, CAD, valvular disease, and DM2.      She is now established with cardiologist Dr Rodrigues for surveillance CAD.       DM2 F/u  She is compliant with all her diabetic medications and diet. In June 2024, her A1c was 8.1%; we adjusted dosing of her metformin and added Jardiance; December 2024 her A1c is 7.9%; Jardiance was removed from her insurance drug formulary in 2025....so she was started on Farxiga. She has only been taking her Farxiga for 1 weeks. Today her A1c is 9.4%     Ophthalmologist appt April 1 [facility]meron.         Review of Systems   Constitutional:  Negative for appetite change and fever.   Respiratory:  Negative for chest tightness and shortness of breath.    Cardiovascular:  Negative for chest pain and palpitations.   Gastrointestinal:  Negative for abdominal pain, nausea and vomiting.   Neurological:  Negative for dizziness, light-headedness and headaches.   Psychiatric/Behavioral:  Negative for dysphoric mood and sleep disturbance. The patient is not nervous/anxious.            Past Medical History:  Past Medical History:   Diagnosis Date    Diabetes mellitus, type 2     Hyperlipidemia     Hypertension       Past Surgical History:   Procedure Laterality Date    AORTIC VALVE REPLACEMENT  12/01/2013    ATRIAL SEPTECTOMY OR SEPTOSTOMY, OPEN HEART WITH CARDIOPULMONARY BYPASS      CORONARY ARTERY BYPASS GRAFT  11/01/2009    EYE SURGERY        Review of patient's allergies indicates:  No Known Allergies   Current Medications[1]  Social History[2]   No family history on file.     Objective:      Physical Exam  Constitutional:       Appearance: She is well-developed.   HENT:      Head: Normocephalic and atraumatic.   Eyes:       General: No scleral icterus.     Conjunctiva/sclera: Conjunctivae normal.      Pupils: Pupils are equal, round, and reactive to light.   Neck:      Thyroid: No thyroid mass.      Trachea: Trachea normal.   Pulmonary:      Effort: Pulmonary effort is normal.   Musculoskeletal:      Cervical back: Normal range of motion and neck supple.   Neurological:      Mental Status: She is alert and oriented to person, place, and time.   Psychiatric:         Mood and Affect: Mood normal.         Behavior: Behavior normal.         Assessment:     1. Type 2 diabetes mellitus with hyperglycemia, with long-term current use of insulin Active   2. Type 2 diabetes mellitus with stage 3b chronic kidney disease, with long-term current use of insulin Active   3. Class 2 severe obesity due to excess calories with serious comorbidity and body mass index (BMI) of 35.0 to 35.9 in adult Active     Plan:       PROBLEM LIST     Type 2 diabetes mellitus with hyperglycemia, with long-term current use of insulin  -     Hemoglobin A1C, POCT    Type 2 diabetes mellitus with stage 3b chronic kidney disease, with long-term current use of insulin    Class 2 severe obesity due to excess calories with serious comorbidity and body mass index (BMI) of 35.0 to 35.9 in adult        A1c now 9.4%... but she just started Farxiga 1 week ago. Instructed her to continue Farxiga with her other medications. We spoke at length regarding her ADA diet and how she can improve this. She will RTC in 12 wk for repeat A1c check. In the meantime, I recommended walking 30 min daily to help reduce BMI.        [1]   Current Outpatient Medications   Medication Sig Dispense Refill    meclizine (ANTIVERT) 25 mg tablet Take 25 mg by mouth 2 (two) times daily as needed for Dizziness.      aspirin 81 MG Chew Take 1 tablet (81 mg total) by mouth once daily. 90 tablet 3    dapagliflozin propanediol (FARXIGA) 10 mg tablet Take 1 tablet (10 mg total) by mouth once daily. Jardiance  "discontinued 90 tablet 3    glimepiride (AMARYL) 4 MG tablet TAKE 1 TABLET(4 MG) BY MOUTH TWICE DAILY 180 tablet 3    insulin glargine U-100, Lantus, (BASAGLAR KWIKPEN U-100 INSULIN) 100 unit/mL (3 mL) InPn pen INJECT 30 UNITS UNDER THE SKIN ONCE DAILY 27 mL 3    lisinopriL (PRINIVIL,ZESTRIL) 40 MG tablet Take 40 mg by mouth once daily.      magnesium oxide (MAG-OX) 400 mg (241.3 mg magnesium) tablet Take 1 tablet (400 mg total) by mouth 2 (two) times daily. 180 tablet 3    metFORMIN (GLUCOPHAGE) 850 MG tablet Take 1 tablet (850 mg total) by mouth 2 (two) times daily with meals. 180 tablet 3    metoprolol tartrate (LOPRESSOR) 50 MG tablet Take 1 tablet (50 mg total) by mouth 2 (two) times daily. 180 tablet 1    pen needle, diabetic (BD PATTY 2ND GEN PEN NEEDLE) 32 gauge x 5/32" Ndle USE AS DIRECTED 100 each 2    potassium chloride (MICRO-K) 10 MEQ CpSR Take 10 mEq by mouth 2 (two) times daily.      simvastatin (ZOCOR) 40 MG tablet Take 40 mg by mouth every evening.      torsemide (DEMADEX) 10 MG Tab Take 2 tablets (20 mg total) by mouth once daily. 90 tablet 3     No current facility-administered medications for this visit.   [2]   Social History  Socioeconomic History    Marital status:    Tobacco Use    Smoking status: Never    Smokeless tobacco: Never   Substance and Sexual Activity    Alcohol use: Not Currently    Drug use: Never    Sexual activity: Not Currently     "

## 2025-04-01 LAB
LEFT EYE DM RETINOPATHY: NEGATIVE
RIGHT EYE DM RETINOPATHY: NEGATIVE

## 2025-06-12 ENCOUNTER — TELEPHONE (OUTPATIENT)
Dept: FAMILY MEDICINE | Facility: CLINIC | Age: 80
End: 2025-06-12
Payer: MEDICARE

## 2025-06-12 NOTE — TELEPHONE ENCOUNTER
Copied from CRM #[id number]. Topic: General Inquiry - Return Call  >> Jun 12, 2025  9:21 AM Eh wrote:  ..Type:  Patient Requesting Call    Who Called:Patricia/[facility]  Does the patient know what this is regarding?:fax that was sent on yesterday  Would the patient rather a call back or a response via [facility]? Call   Best Call Back Number:[phone]  Additional Information: reference number [id number]

## 2025-06-16 ENCOUNTER — TELEPHONE (OUTPATIENT)
Dept: FAMILY MEDICINE | Facility: CLINIC | Age: 80
End: 2025-06-16
Payer: MEDICARE

## 2025-06-16 NOTE — TELEPHONE ENCOUNTER
Copied from CRM #[id number]. Topic: General Inquiry - Return Call  >> Jun 16, 2025  1:48 PM Allie wrote:  Type:  Patient Returning Call    Who Called:rojelio  Who Left Message for Patient:paul  Does the patient know what this is regarding?:diabetic supplies,  medical dx, medication list, date of dx  Would the patient rather a call back or a response via [facility]?   Best Call Back Number:[phone]  Additional Information: fax#[phone]

## 2025-06-16 NOTE — TELEPHONE ENCOUNTER
Copied from CRM #[id number]. Topic: General Inquiry - Patient Advice  >> Jun 16, 2025  8:11 AM Allie wrote:  Type:  Patient Requestin g Call Back    Who Called:rojelio  Does the patient know what this is regarding?:free style willard monitoring system, medical records, diabetes dx  Would the patient rather a call back or a response via [facility]?   Best Call Back Number:[phone]  Additional Information: fax#[phone]  Information was faxed 06/13/2025

## 2025-06-17 ENCOUNTER — TELEPHONE (OUTPATIENT)
Dept: FAMILY MEDICINE | Facility: CLINIC | Age: 80
End: 2025-06-17
Payer: MEDICARE

## 2025-06-17 NOTE — TELEPHONE ENCOUNTER
Copied from CRM #[id number]. Topic: Escalation - Escalation To Clinic  >> Jun 17, 2025  9:16 AM Allie wrote:  Type:  General inquiry    Who Called:[facility]  Does the patient know what this is regarding?:fibre free style diabetic supplies  Would the patient rather a call back or a response via [facility]?   Best Call Back Number:[phone]  Additional Information: several request have been sent requesting medical records and forms completed by provider , no response

## 2025-06-17 NOTE — TELEPHONE ENCOUNTER
Copied from CRM #[id number]. Topic: General Inquiry - Patient Advice  >> Jun 17, 2025  3:37 PM Allie wrote:  Type:  Call Back    Who Called:[facility]  Does the patient know what this is regarding?:lab orders  Would the patient rather a call back or a response via [facility]?   Best Call Back Number:[phone]  Additional Information: ref#[id number]

## 2025-06-18 ENCOUNTER — TELEPHONE (OUTPATIENT)
Dept: FAMILY MEDICINE | Facility: CLINIC | Age: 80
End: 2025-06-18
Payer: MEDICARE

## 2025-06-18 NOTE — TELEPHONE ENCOUNTER
Copied from CRM #[id number]. Topic: Appointments - Appointment Confirmation  >> Jun 18, 2025 10:46 AM Danette wrote:   [employer] is calling in regards to supplies orders please call them back at [phone]

## 2025-06-19 ENCOUNTER — PATIENT MESSAGE (OUTPATIENT)
Dept: FAMILY MEDICINE | Facility: CLINIC | Age: 80
End: 2025-06-19
Payer: MEDICARE

## 2025-06-19 ENCOUNTER — TELEPHONE (OUTPATIENT)
Dept: FAMILY MEDICINE | Facility: CLINIC | Age: 80
End: 2025-06-19
Payer: MEDICARE

## 2025-06-19 NOTE — TELEPHONE ENCOUNTER
Copied from CRM #[id number]. Topic: General Inquiry - Patient Advice  >> Jun 19, 2025  2:49 PM Chelsi wrote:  Type:  Needs Medical Advice    Who Called: Gunjan Addison with [facility]  Symptoms (please be specific): na   How long has patient had these symptoms:  na  Pharmacy name and phone #:  na  Would the patient rather a call back or a response via [facility]? call  Best Call Back Number: [phone]  Additional Information: requesting to speak with office as a lab request was faxed last week for provider to review for approval and no response was received  Ref#[id number]

## 2025-06-23 ENCOUNTER — TELEPHONE (OUTPATIENT)
Dept: FAMILY MEDICINE | Facility: CLINIC | Age: 80
End: 2025-06-23
Payer: MEDICARE

## 2025-06-23 NOTE — TELEPHONE ENCOUNTER
Copied from CRM #[id number]. Topic: General Inquiry - Patient Advice  >> Jun 23, 2025  1:53 PM Cecilia wrote:  Type:  Needs Medical Advice    Who Called: Edith with [email or web address]  Would the patient rather a call back or a response via [facility]? Call back  Best Call Back Number: [phone]  Additional Information: edith requesting a call back needing to know if fax was received for pt

## 2025-06-26 ENCOUNTER — TELEPHONE (OUTPATIENT)
Dept: FAMILY MEDICINE | Facility: CLINIC | Age: 80
End: 2025-06-26
Payer: MEDICARE

## 2025-06-26 NOTE — TELEPHONE ENCOUNTER
Copied from CRM #[id number]. Topic: Escalation - Escalation To Clinic  >> Jun 25, 2025  3:53 PM Allie wrote:  Type:  Patient Requesting Call Back    Who Called:[facility]  Does the patient know what this is regarding?:fax-receipt pt is requesting genetic testing and lab has gottened no response from clinic in 2 weeks  Would the patient rather a call back or a response via [facility]?   Best Call Back Number:[phone] ref#[id number]  Additional Information: n/a

## 2025-06-26 NOTE — TELEPHONE ENCOUNTER
Copied from CRM #[id number]. Topic: Medications - Medication Question  >> Jun 26, 2025  2:46 PM Danette wrote:  [facility] is calling in regards need to know if patient is taking blood thinner please call them back at [phone]

## 2025-07-03 ENCOUNTER — TELEPHONE (OUTPATIENT)
Dept: FAMILY MEDICINE | Facility: CLINIC | Age: 80
End: 2025-07-03
Payer: MEDICARE

## 2025-07-03 NOTE — TELEPHONE ENCOUNTER
Copied from CRM #[id number]. Topic: Medications - Medication Status Check   >> Jul 3, 2025 10:47 AM Christine wrote:  Mary voss/[employer] calling for status of glosure monitor sent 6/13, 16 & 7/1 and she can be reached at [phone] and fax [phone]

## 2025-07-07 ENCOUNTER — TELEPHONE (OUTPATIENT)
Dept: FAMILY MEDICINE | Facility: CLINIC | Age: 80
End: 2025-07-07
Payer: MEDICARE

## 2025-07-07 NOTE — TELEPHONE ENCOUNTER
Copied from CRM #[id number]. Topic: General Inquiry - Information Request  >> Jul 7, 2025  3:18 PM Christine wrote:  Mary voss/[employer] calling for status of glosure monitor sent 6/13, 16 & 7/1 and she can be reached at [phone] and fax [phone]

## 2025-08-29 ENCOUNTER — PATIENT MESSAGE (OUTPATIENT)
Dept: ADMINISTRATIVE | Facility: HOSPITAL | Age: 80
End: 2025-08-29
Payer: MEDICARE